# Patient Record
Sex: FEMALE | Race: BLACK OR AFRICAN AMERICAN | NOT HISPANIC OR LATINO | Employment: UNEMPLOYED | URBAN - METROPOLITAN AREA
[De-identification: names, ages, dates, MRNs, and addresses within clinical notes are randomized per-mention and may not be internally consistent; named-entity substitution may affect disease eponyms.]

---

## 2019-06-26 ENCOUNTER — HOSPITAL ENCOUNTER (EMERGENCY)
Facility: HOSPITAL | Age: 29
Discharge: HOME | End: 2019-06-26
Attending: EMERGENCY MEDICINE

## 2019-06-26 VITALS
WEIGHT: 172.84 LBS | DIASTOLIC BLOOD PRESSURE: 71 MMHG | RESPIRATION RATE: 16 BRPM | SYSTOLIC BLOOD PRESSURE: 107 MMHG | HEART RATE: 80 BPM | OXYGEN SATURATION: 100 % | TEMPERATURE: 97.6 F

## 2019-06-26 DIAGNOSIS — K08.89 PAIN, DENTAL: Primary | ICD-10-CM

## 2019-06-26 DIAGNOSIS — K04.7 DENTAL INFECTION: ICD-10-CM

## 2019-06-26 PROCEDURE — 99282 EMERGENCY DEPT VISIT SF MDM: CPT

## 2019-06-26 PROCEDURE — 99283 EMERGENCY DEPT VISIT LOW MDM: CPT | Performed by: PHYSICIAN ASSISTANT

## 2019-06-26 RX ORDER — AMOXICILLIN AND CLAVULANATE POTASSIUM 875; 125 MG/1; MG/1
1 TABLET, FILM COATED ORAL EVERY 12 HOURS SCHEDULED
Qty: 20 TABLET | Refills: 0 | Status: SHIPPED | OUTPATIENT
Start: 2019-06-26 | End: 2019-07-06

## 2019-06-27 NOTE — ED PROVIDER NOTES
History  Chief Complaint   Patient presents with    Dental Pain     DIMPLE dental pain that started this morning  Patient is a 71-year-old female with no significant past medical history presents for evaluation of left upper dental pain  She states that she has a known broken/chipped tooth and cavity to the area  She states that she woke up this morning and had some swelling to the cheek  She took some ibuprofen this morning without significant release  She had not seen a dentist for a while  She states that she has been here in [place] for the past 4 months but is from [place] originally  She states that she was seeing a dentist and orthodontist in [place]  She had braces on at one point but some have fallen off  She denies any fever, chills, nausea vomiting diarrhea, chest pain, shortness breath, abdominal pain, neck pain or swelling, sore throat or difficulty swallowing  None       History reviewed  No pertinent past medical history  History reviewed  No pertinent surgical history  History reviewed  No pertinent family history  I have reviewed and agree with the history as documented  Social History     Tobacco Use    Smoking status: Current Some Day Smoker    Smokeless tobacco: Never Used    Tobacco comment: when asked if daily use patient states "not really"    Substance Use Topics    Alcohol use: Yes     Comment: socially    Drug use: Not Currently        Review of Systems   Constitutional: Negative for chills and fever  HENT: Positive for dental problem and facial swelling  Negative for ear pain, sore throat and trouble swallowing  Eyes: Negative for visual disturbance  Respiratory: Negative for shortness of breath  Cardiovascular: Negative for chest pain  Gastrointestinal: Negative for abdominal pain, diarrhea, nausea and vomiting  Musculoskeletal: Negative for neck pain and neck stiffness  Skin: Negative for rash     All other systems reviewed and are negative  Physical Exam  Physical Exam   Constitutional: She is oriented to person, place, and time  She appears well-developed and well-nourished  No distress  HENT:   Head: Normocephalic and atraumatic  Right Ear: Hearing, tympanic membrane, external ear and ear canal normal    Left Ear: Hearing, tympanic membrane, external ear and ear canal normal    Nose: Nose normal    Mouth/Throat: Uvula is midline, oropharynx is clear and moist and mucous membranes are normal  No oral lesions  No trismus in the jaw  Abnormal dentition  Dental caries present  No uvula swelling  No oropharyngeal exudate, posterior oropharyngeal edema or posterior oropharyngeal erythema  Left upper tooth chipped/broken to the gum line with caries  No obvious fluctuant dental abscess  There is some gum erythema  There is some mild overlying facial/cheek swelling without induration or fluctuance  Handles oral secretions well without difficulty  Eyes: Conjunctivae and EOM are normal    Neck: Normal range of motion  Neck supple  Cardiovascular: Normal rate, regular rhythm and normal heart sounds  Exam reveals no gallop and no friction rub  No murmur heard  Pulmonary/Chest: Effort normal and breath sounds normal  No stridor  No respiratory distress  She has no wheezes  Abdominal: Soft  Bowel sounds are normal  She exhibits no distension  There is no tenderness  There is no guarding  Musculoskeletal: Normal range of motion  Lymphadenopathy:     She has no cervical adenopathy  Neurological: She is alert and oriented to person, place, and time  Skin: Skin is warm and dry  She is not diaphoretic  Psychiatric: She has a normal mood and affect  Her behavior is normal    Nursing note and vitals reviewed        Vital Signs  ED Triage Vitals [06/26/19 2006]   Temperature Pulse Respirations Blood Pressure SpO2   97 6 °F (36 4 °C) 80 16 107/71 100 %      Temp Source Heart Rate Source Patient Position - Orthostatic VS BP Location FiO2 (%)   Oral -- Sitting Right arm --      Pain Score       6           Vitals:    06/26/19 2006   BP: 107/71   Pulse: 80   Patient Position - Orthostatic VS: Sitting         Visual Acuity      ED Medications  Medications - No data to display    Diagnostic Studies  Results Reviewed     None                 No orders to display              Procedures  Procedures       ED Course                               MDM  Number of Diagnoses or Management Options  Dental infection:   Pain, dental:   Diagnosis management comments: Discussed dental caries and infection with the patient  Will start on Augmentin  Advised to take Tylenol or Motrin for pain  Instructed she needs to see a dentist as soon as possible  Given contact information so for multiple dental clinics in the area  Also given information for [facility] to call tomorrow for an appointment  Discussed strict return precautions if symptoms worsen or new symptoms arise  Patient states understanding and agrees with plan  Patient Progress  Patient progress: stable      Disposition  Final diagnoses:   Pain, dental   Dental infection     Time reflects when diagnosis was documented in both MDM as applicable and the Disposition within this note     Time User Action Codes Description Comment    6/26/2019  8:19 PM Ana Earl [K08 89] Pain, dental     6/26/2019  8:19 PM Ana Earl [K04 7] Dental infection       ED Disposition     ED Disposition Condition Date/Time Comment    Discharge Stable Wed Jun 26, 2019  8:19 PM Lillian Parmar discharge to home/self care              Follow-up Information     Follow up With Specialties Details Why Contact Info Additional [address] [facility] Family Medicine Schedule an appointment as soon as possible for a visit in 1 day  [address] [place] [address]  [phone]  [address] [place] [address],[address] [facility], [place], [place], [phone]    [address] [facility]  Schedule an appointment as soon as possible for a visit in 1 day  [address]  [place] [address]  [phone]     [address]  Schedule an appointment as soon as possible for a visit in 1 day  [address] [facility]     [address] [facility]  Schedule an appointment as soon as possible for a visit in 1 day  [address] [facility],4Th Floor for Oral and Maxillofacial Surgery [place]  Schedule an appointment as soon as possible for a visit in 1 day  [address]  [phone]           Discharge Medication List as of 6/26/2019  8:21 PM      START taking these medications    Details   amoxicillin-clavulanate (AUGMENTIN) 875-125 mg per tablet Take 1 tablet by mouth every 12 (twelve) hours for 10 days, Starting Wed 6/26/2019, Until Sat 7/6/2019, Print           No discharge procedures on file      ED Provider  Electronically Signed by           Alejo Renteria PA-C  06/26/19 2034

## 2023-09-13 ENCOUNTER — HOSPITAL ENCOUNTER (EMERGENCY)
Facility: HOSPITAL | Age: 33
Discharge: HOME | End: 2023-09-13
Attending: EMERGENCY MEDICINE | Admitting: EMERGENCY MEDICINE

## 2023-09-13 VITALS
RESPIRATION RATE: 17 BRPM | OXYGEN SATURATION: 98 % | WEIGHT: 172.18 LBS | HEART RATE: 86 BPM | DIASTOLIC BLOOD PRESSURE: 74 MMHG | TEMPERATURE: 98.6 F | SYSTOLIC BLOOD PRESSURE: 111 MMHG

## 2023-09-13 DIAGNOSIS — R51.9 HEADACHE: Primary | ICD-10-CM

## 2023-09-13 DIAGNOSIS — R11.2 NAUSEA AND VOMITING: ICD-10-CM

## 2023-09-13 LAB
ALBUMIN SERPL BCP-MCNC: 4.1 G/DL (ref 3.5–5)
ALP SERPL-CCNC: 57 U/L (ref 34–104)
ALT SERPL W P-5'-P-CCNC: 29 U/L (ref 7–52)
ANION GAP SERPL CALCULATED.3IONS-SCNC: 5 MMOL/L
AST SERPL W P-5'-P-CCNC: 22 U/L (ref 13–39)
BASOPHILS # BLD AUTO: 0.01 THOUSANDS/ÂΜL (ref 0–0.1)
BASOPHILS NFR BLD AUTO: 0 % (ref 0–1)
BILIRUB SERPL-MCNC: 0.2 MG/DL (ref 0.2–1)
BUN SERPL-MCNC: 6 MG/DL (ref 5–25)
CALCIUM SERPL-MCNC: 9 MG/DL (ref 8.4–10.2)
CHLORIDE SERPL-SCNC: 104 MMOL/L (ref 96–108)
CO2 SERPL-SCNC: 29 MMOL/L (ref 21–32)
CREAT SERPL-MCNC: 0.62 MG/DL (ref 0.6–1.3)
EOSINOPHIL # BLD AUTO: 0 THOUSAND/ÂΜL (ref 0–0.61)
EOSINOPHIL NFR BLD AUTO: 0 % (ref 0–6)
ERYTHROCYTE [DISTWIDTH] IN BLOOD BY AUTOMATED COUNT: 12.3 % (ref 11.6–15.1)
EXT PREGNANCY TEST URINE: NEGATIVE
EXT. CONTROL: NORMAL
GFR SERPL CREATININE-BSD FRML MDRD: 118 ML/MIN/1.73SQ M
GLUCOSE SERPL-MCNC: 85 MG/DL (ref 65–140)
HCT VFR BLD AUTO: 41.9 % (ref 34.8–46.1)
HGB BLD-MCNC: 13.8 G/DL (ref 11.5–15.4)
IMM GRANULOCYTES # BLD AUTO: 0 THOUSAND/UL (ref 0–0.2)
IMM GRANULOCYTES NFR BLD AUTO: 0 % (ref 0–2)
LIPASE SERPL-CCNC: 10 U/L (ref 11–82)
LYMPHOCYTES # BLD AUTO: 2.37 THOUSANDS/ÂΜL (ref 0.6–4.47)
LYMPHOCYTES NFR BLD AUTO: 66 % (ref 14–44)
MCH RBC QN AUTO: 31.6 PG (ref 26.8–34.3)
MCHC RBC AUTO-ENTMCNC: 32.9 G/DL (ref 31.4–37.4)
MCV RBC AUTO: 96 FL (ref 82–98)
MONOCYTES # BLD AUTO: 0.38 THOUSAND/ÂΜL (ref 0.17–1.22)
MONOCYTES NFR BLD AUTO: 11 % (ref 4–12)
NEUTROPHILS # BLD AUTO: 0.84 THOUSANDS/ÂΜL (ref 1.85–7.62)
NEUTS SEG NFR BLD AUTO: 23 % (ref 43–75)
NRBC BLD AUTO-RTO: 0 /100 WBCS
PLATELET # BLD AUTO: 213 THOUSANDS/UL (ref 149–390)
PMV BLD AUTO: 9.4 FL (ref 8.9–12.7)
POTASSIUM SERPL-SCNC: 3.4 MMOL/L (ref 3.5–5.3)
PROT SERPL-MCNC: 6.9 G/DL (ref 6.4–8.4)
RBC # BLD AUTO: 4.37 MILLION/UL (ref 3.81–5.12)
SODIUM SERPL-SCNC: 138 MMOL/L (ref 135–147)
WBC # BLD AUTO: 3.6 THOUSAND/UL (ref 4.31–10.16)

## 2023-09-13 PROCEDURE — 96361 HYDRATE IV INFUSION ADD-ON: CPT

## 2023-09-13 PROCEDURE — 99283 EMERGENCY DEPT VISIT LOW MDM: CPT

## 2023-09-13 PROCEDURE — 36415 COLL VENOUS BLD VENIPUNCTURE: CPT | Performed by: EMERGENCY MEDICINE

## 2023-09-13 PROCEDURE — 83690 ASSAY OF LIPASE: CPT | Performed by: EMERGENCY MEDICINE

## 2023-09-13 PROCEDURE — 81025 URINE PREGNANCY TEST: CPT | Performed by: PHYSICIAN ASSISTANT

## 2023-09-13 PROCEDURE — 85025 COMPLETE CBC W/AUTO DIFF WBC: CPT | Performed by: EMERGENCY MEDICINE

## 2023-09-13 PROCEDURE — 96374 THER/PROPH/DIAG INJ IV PUSH: CPT

## 2023-09-13 PROCEDURE — 80053 COMPREHEN METABOLIC PANEL: CPT | Performed by: EMERGENCY MEDICINE

## 2023-09-13 PROCEDURE — 96375 TX/PRO/DX INJ NEW DRUG ADDON: CPT

## 2023-09-13 RX ORDER — DIPHENHYDRAMINE HYDROCHLORIDE 50 MG/ML
25 INJECTION INTRAMUSCULAR; INTRAVENOUS ONCE
Status: COMPLETED | OUTPATIENT
Start: 2023-09-13 | End: 2023-09-13

## 2023-09-13 RX ORDER — NAPROXEN 500 MG/1
500 TABLET ORAL 2 TIMES DAILY WITH MEALS
Qty: 30 TABLET | Refills: 0 | Status: SHIPPED | OUTPATIENT
Start: 2023-09-13

## 2023-09-13 RX ORDER — ONDANSETRON 4 MG/1
4 TABLET, ORALLY DISINTEGRATING ORAL EVERY 6 HOURS PRN
Qty: 20 TABLET | Refills: 0 | Status: SHIPPED | OUTPATIENT
Start: 2023-09-13

## 2023-09-13 RX ORDER — ACETAMINOPHEN 325 MG/1
975 TABLET ORAL ONCE
Status: COMPLETED | OUTPATIENT
Start: 2023-09-13 | End: 2023-09-13

## 2023-09-13 RX ORDER — ONDANSETRON 2 MG/ML
4 INJECTION INTRAMUSCULAR; INTRAVENOUS ONCE
Status: COMPLETED | OUTPATIENT
Start: 2023-09-13 | End: 2023-09-13

## 2023-09-13 RX ORDER — KETOROLAC TROMETHAMINE 30 MG/ML
15 INJECTION, SOLUTION INTRAMUSCULAR; INTRAVENOUS ONCE
Status: COMPLETED | OUTPATIENT
Start: 2023-09-13 | End: 2023-09-13

## 2023-09-13 RX ADMIN — SODIUM CHLORIDE 1000 ML: 0.9 INJECTION, SOLUTION INTRAVENOUS at 18:16

## 2023-09-13 RX ADMIN — KETOROLAC TROMETHAMINE 15 MG: 30 INJECTION, SOLUTION INTRAMUSCULAR; INTRAVENOUS at 18:10

## 2023-09-13 RX ADMIN — ACETAMINOPHEN 325MG 975 MG: 325 TABLET ORAL at 18:09

## 2023-09-13 RX ADMIN — DIPHENHYDRAMINE HYDROCHLORIDE 25 MG: 50 INJECTION, SOLUTION INTRAMUSCULAR; INTRAVENOUS at 18:10

## 2023-09-13 RX ADMIN — ONDANSETRON 4 MG: 2 INJECTION INTRAMUSCULAR; INTRAVENOUS at 17:34

## 2023-09-13 NOTE — ED PROVIDER NOTES
History  Chief Complaint   Patient presents with   • Headache     Pt reports being sick since Sunday, states her head is heavy and throbbing, nausea, vomiting intermittently. Bridget Cagle is a 28 y.o. female with no significant past medical history presenting to the ER complaining of intermittent headache over the past 3 days. Patient reports that she feels the headache is a very tight band around her forehead. She reports that as of recent time she has been stressed out as she is going through a divorce and she feels this may be worsening her headache. Reports associated nausea and vomiting about 1-2 times a day. Denies any visual changes, neck pain, fevers, chills, numbness or tingling in any of the extremities, chest pain, shortness of breath, or urinary symptoms. Denies any trauma. None       History reviewed. No pertinent past medical history. History reviewed. No pertinent surgical history. History reviewed. No pertinent family history. I have reviewed and agree with the history as documented. E-Cigarette/Vaping     E-Cigarette/Vaping Substances     Social History     Tobacco Use   • Smoking status: Some Days   • Smokeless tobacco: Never   • Tobacco comments:     when asked if daily use patient states "not really"    Substance Use Topics   • Alcohol use: Yes     Comment: socially   • Drug use: Not Currently       Review of Systems   Constitutional: Negative for chills and fever. HENT: Negative for ear pain and sore throat. Eyes: Negative for pain and visual disturbance. Respiratory: Negative for cough and shortness of breath. Cardiovascular: Negative for chest pain and palpitations. Gastrointestinal: Negative for abdominal pain and vomiting. Genitourinary: Negative for dysuria and hematuria. Musculoskeletal: Negative for arthralgias and back pain. Skin: Negative for color change and rash. Neurological: Positive for headaches. Negative for seizures and syncope. All other systems reviewed and are negative. Physical Exam  Physical Exam  Vitals and nursing note reviewed. Constitutional:       General: She is not in acute distress. Appearance: She is well-developed. She is not ill-appearing, toxic-appearing or diaphoretic. HENT:      Head: Normocephalic and atraumatic. Eyes:      Conjunctiva/sclera: Conjunctivae normal.   Cardiovascular:      Rate and Rhythm: Normal rate and regular rhythm. Heart sounds: No murmur heard. Pulmonary:      Effort: Pulmonary effort is normal. No respiratory distress. Breath sounds: Normal breath sounds. Abdominal:      Palpations: Abdomen is soft. Tenderness: There is no abdominal tenderness. Musculoskeletal:         General: No swelling. Cervical back: Normal range of motion and neck supple. No rigidity or tenderness. Skin:     General: Skin is warm and dry. Capillary Refill: Capillary refill takes less than 2 seconds. Neurological:      General: No focal deficit present. Mental Status: She is alert and oriented to person, place, and time. Cranial Nerves: No cranial nerve deficit. Sensory: No sensory deficit. Motor: No weakness.       Gait: Gait normal.   Psychiatric:         Mood and Affect: Mood normal.         Vital Signs  ED Triage Vitals [09/13/23 1550]   Temperature Pulse Respirations Blood Pressure SpO2   98.6 °F (37 °C) 99 18 107/68 97 %      Temp Source Heart Rate Source Patient Position - Orthostatic VS BP Location FiO2 (%)   Oral Monitor Sitting Right arm --      Pain Score       9           Vitals:    09/13/23 1550 09/13/23 1836   BP: 107/68 111/74   Pulse: 99 86   Patient Position - Orthostatic VS: Sitting Lying         Visual Acuity  Visual Acuity    Flowsheet Row Most Recent Value   L Pupil Size (mm) 3   R Pupil Size (mm) 3          ED Medications  Medications   ondansetron (ZOFRAN) injection 4 mg (4 mg Intravenous Given 9/13/23 4390)   diphenhydrAMINE (BENADRYL) injection 25 mg (25 mg Intravenous Given 9/13/23 1810)   ketorolac (TORADOL) injection 15 mg (15 mg Intravenous Given 9/13/23 1810)   sodium chloride 0.9 % bolus 1,000 mL (0 mL Intravenous Stopped 9/13/23 1916)   acetaminophen (TYLENOL) tablet 975 mg (975 mg Oral Given 9/13/23 1809)       Diagnostic Studies  Results Reviewed     Procedure Component Value Units Date/Time    Comprehensive metabolic panel [[id number]]  (Abnormal) Collected: 09/13/23 1734    Lab Status: Final result Specimen: Blood from Arm, Right Updated: 09/13/23 1811     Sodium 138 mmol/L      Potassium 3.4 mmol/L      Chloride 104 mmol/L      CO2 29 mmol/L      ANION GAP 5 mmol/L      BUN 6 mg/dL      Creatinine 0.62 mg/dL      Glucose 85 mg/dL      Calcium 9.0 mg/dL      AST 22 U/L      ALT 29 U/L      Alkaline Phosphatase 57 U/L      Total Protein 6.9 g/dL      Albumin 4.1 g/dL      Total Bilirubin 0.20 mg/dL      eGFR 118 ml/min/1.73sq m     Narrative:      [place] guidelines for Chronic Kidney Disease (CKD):   •  Stage 1 with normal or high GFR (GFR > 90 mL/min/1.73 square meters)  •  Stage 2 Mild CKD (GFR = 60-89 mL/min/1.73 square meters)  •  Stage 3A Moderate CKD (GFR = 45-59 mL/min/1.73 square meters)  •  Stage 3B Moderate CKD (GFR = 30-44 mL/min/1.73 square meters)  •  Stage 4 Severe CKD (GFR = 15-29 mL/min/1.73 square meters)  •  Stage 5 End Stage CKD (GFR <15 mL/min/1.73 square meters)  Note: GFR calculation is accurate only with a steady state creatinine    Lipase [[id number]]  (Abnormal) Collected: 09/13/23 1734    Lab Status: Final result Specimen: Blood from Arm, Right Updated: 09/13/23 1811     Lipase 10 u/L     POCT pregnancy, urine [[id number]]  (Normal) Resulted: 09/13/23 1804    Lab Status: Final result Specimen: Urine Updated: 09/13/23 1804     EXT Preg Test, Ur Negative     Control Valid    CBC and differential [[id number]]  (Abnormal) Collected: 09/13/23 1734    Lab Status: Final result Specimen: Blood from Arm, Right Updated: 09/13/23 1743     WBC 3.60 Thousand/uL      RBC 4.37 Million/uL      Hemoglobin 13.8 g/dL      Hematocrit 41.9 %      MCV 96 fL      MCH 31.6 pg      MCHC 32.9 g/dL      RDW 12.3 %      MPV 9.4 fL      Platelets 101 Thousands/uL      nRBC 0 /100 WBCs      Neutrophils Relative 23 %      Immat GRANS % 0 %      Lymphocytes Relative 66 %      Monocytes Relative 11 %      Eosinophils Relative 0 %      Basophils Relative 0 %      Neutrophils Absolute 0.84 Thousands/µL      Immature Grans Absolute 0.00 Thousand/uL      Lymphocytes Absolute 2.37 Thousands/µL      Monocytes Absolute 0.38 Thousand/µL      Eosinophils Absolute 0.00 Thousand/µL      Basophils Absolute 0.01 Thousands/µL                  No orders to display              Procedures  Procedures         ED Course                               SBIRT 22yo+    Flowsheet Row Most Recent Value   Initial Alcohol Screen: US AUDIT-C     1. How often do you have a drink containing alcohol? 0 Filed at: 09/13/2023 1549   2. How many drinks containing alcohol do you have on a typical day you are drinking? 0 Filed at: 09/13/2023 1549   3b. FEMALE Any Age, or MALE 65+: How often do you have 4 or more drinks on one occassion? 0 Filed at: 09/13/2023 1549   Audit-C Score 0 Filed at: 09/13/2023 1549   FER: How many times in the past year have you. .. Used an illegal drug or used a prescription medication for non-medical reasons? Never Filed at: 09/13/2023 [facility]  Bridger Bella is a 28 y.o. female with no significant past medical history presenting to the ER complaining of intermittent headache over the past 3 days. Patient reports that she feels the headache is a very tight band around her forehead. Reports she has been under a lot of stress lately. Denies any associated symptoms. On exam patient is well-appearing in no acute distress. Vital signs within normal limits.   Physical examination reveals no neck rigidity or tenderness to palpation. No focal neurologic deficits on my exam.  Discussed patient the risk versus benefit of imaging the head to evaluate headache and utilized her decision making to avoid any imaging at this time. We will however check labs to rule out anemia, electrolyte abnormalities, and check for pregnancy. We will treat at this time with Toradol, Benadryl, Zofran, and IV fluids. Patient agreeable with plan. Lab work unremarkable. Discussed results with patient. After receiving medications patient reports complete resolution of headache and request be discharged. Extensive discussed with patient appropriate supportive care at home for headaches and advised close follow-up with PCP for reevaluation. Advised strict return precautions ER. Patient is understanding and agreeable to plan. Headache: acute illness or injury  Nausea and vomiting: acute illness or injury  Amount and/or Complexity of Data Reviewed  Labs: ordered. Risk  OTC drugs. Prescription drug management. Disposition  Final diagnoses:   Headache   Nausea and vomiting     Time reflects when diagnosis was documented in both MDM as applicable and the Disposition within this note     Time User Action Codes Description Comment    9/13/2023  6:51 PM [facility] Add [R51.9] Headache     9/13/2023  6:52 PM [facility] Add [R11.2] Nausea and vomiting       ED Disposition     ED Disposition   Discharge    Condition   Stable    Date/Time   Wed Sep 13, 2023  6:51 PM    Comment   hCris Aguayo discharge to home/self care.                Follow-up Information     Follow up With Specialties Details Why Contact Info Additional Information    [facility]  Family Medicine Schedule an appointment as soon as possible for a visit   [address]       [address] [facility] Emergency Department Emergency Medicine  If symptoms worsen [address] [address]  [address] [facility], [address] [facility], [place], [place], [address]          Discharge Medication List as of 9/13/2023  6:53 PM      START taking these medications    Details   naproxen (Naprosyn) 500 mg tablet Take 1 tablet (500 mg total) by mouth 2 (two) times a day with meals, Starting Wed 9/13/2023, Normal      ondansetron (ZOFRAN-ODT) 4 mg disintegrating tablet Take 1 tablet (4 mg total) by mouth every 6 (six) hours as needed for nausea or vomiting, Starting Wed 9/13/2023, Normal             No discharge procedures on file.     PDMP Review     None          ED Provider  Electronically Signed by           Shelby Whipple PA-C  09/13/23 9678

## 2023-12-12 NOTE — PATIENT INSTRUCTIONS
Wellness Visit for Adults   AMBULATORY CARE:   A wellness visit  is when you see your healthcare provider to get screened for health problems. Your healthcare provider will also give you advice on how to stay healthy. Write down your questions so you remember to ask them. Ask your healthcare provider how often you should have a wellness visit. What happens at a wellness visit:  Your healthcare provider will ask about your health, and your family history of health problems. This includes high blood pressure, heart disease, and cancer. He or she will ask if you have symptoms that concern you, if you smoke, and about your mood. You may also be asked about your intake of medicines, supplements, food, and alcohol. Any of the following may be done: Your weight  will be checked. Your height may also be checked so your body mass index (BMI) can be calculated. Your BMI shows if you are at a healthy weight. Your blood pressure  and heart rate will be checked. Your temperature may also be checked. Blood and urine tests  may be done. Blood tests may be done to check your cholesterol levels. Abnormal cholesterol levels increase your risk for heart disease and stroke. You may also need a blood or urine test to check for diabetes if you are at increased risk. Urine tests may be done to look for signs of an infection or kidney disease. A physical exam  includes checking your heartbeat and lungs with a stethoscope. Your healthcare provider may also check your skin to look for sun damage. Screening tests  may be recommended. A screening test is done to check for diseases that may not cause symptoms. The screening tests you may need depend on your age, gender, family history, and lifestyle habits. For example, colorectal screening may be recommended if you are 48years old or older. Screening tests you need if you are a woman:   A Pap smear  is used to screen for cervical cancer.  Pap smears are usually done every 3 to 5 years depending on your age. You may need them more often if you have had abnormal Pap smear test results in the past. Ask your healthcare provider how often you should have a Pap smear. A mammogram  is an x-ray of your breasts to screen for breast cancer. Experts recommend mammograms every 2 years starting at age 48 years. You may need a mammogram at age 52 years or younger if you have an increased risk for breast cancer. Talk to your healthcare provider about when you should start having mammograms and how often you need them. Vaccines you may need:   Get an influenza vaccine  every year. The influenza vaccine protects you from the flu. Several types of viruses cause the flu. The viruses change over time, so new vaccines are made each year. Get a tetanus-diphtheria (Td) booster vaccine  every 10 years. This vaccine protects you against tetanus and diphtheria. Tetanus is a severe infection that may cause painful muscle spasms and lockjaw. Diphtheria is a severe bacterial infection that causes a thick covering in the back of your mouth and throat. Get a human papillomavirus (HPV) vaccine  if you are female and aged 23 to 32 or male 23 to 24 and never received it. This vaccine protects you from HPV infection. HPV is the most common infection spread by sexual contact. HPV may also cause vaginal, penile, and anal cancers. Get a pneumococcal vaccine  if you are aged 72 years or older. The pneumococcal vaccine is an injection given to protect you from pneumococcal disease. Pneumococcal disease is an infection caused by pneumococcal bacteria. The infection may cause pneumonia, meningitis, or an ear infection. Get a shingles vaccine  if you are 60 or older, even if you have had shingles before. The shingles vaccine is an injection to protect you from the varicella-zoster virus. This is the same virus that causes chickenpox.  Shingles is a painful rash that develops in people who had chickenpox or have been exposed to the virus. How to eat healthy:  My Plate is a model for planning healthy meals. It shows the types and amounts of foods that should go on your plate. Fruits and vegetables make up about half of your plate, and grains and protein make up the other half. A serving of dairy is included on the side of your plate. The amount of calories and serving sizes you need depends on your age, gender, weight, and height. Examples of healthy foods are listed below:  Eat a variety of vegetables  such as dark green, red, and orange vegetables. You can also include canned vegetables low in sodium (salt) and frozen vegetables without added butter or sauces. Eat a variety of fresh fruits , canned fruit in 100% juice, frozen fruit, and dried fruit. Include whole grains. At least half of the grains you eat should be whole grains. Examples include whole-wheat bread, wheat pasta, brown rice, and whole-grain cereals such as oatmeal.    Eat a variety of protein foods such as seafood (fish and shellfish), lean meat, and poultry without skin (turkey and chicken). Examples of lean meats include pork leg, shoulder, or tenderloin, and beef round, sirloin, tenderloin, and extra lean ground beef. Other protein foods include eggs and egg substitutes, beans, peas, soy products, nuts, and seeds. Choose low-fat dairy products such as skim or 1% milk or low-fat yogurt, cheese, and cottage cheese. Limit unhealthy fats  such as butter, hard margarine, and shortening. Exercise:  Exercise at least 30 minutes per day on most days of the week. Some examples of exercise include walking, biking, dancing, and swimming. You can also fit in more physical activity by taking the stairs instead of the elevator or parking farther away from stores. Include muscle strengthening activities 2 days each week. Regular exercise provides many health benefits.  It helps you manage your weight, and decreases your risk for type 2 diabetes, heart disease, stroke, and high blood pressure. Exercise can also help improve your mood. Ask your healthcare provider about the best exercise plan for you. General health and safety guidelines:   Do not smoke. Nicotine and other chemicals in cigarettes and cigars can cause lung damage. Ask your healthcare provider for information if you currently smoke and need help to quit. E-cigarettes or smokeless tobacco still contain nicotine. Talk to your healthcare provider before you use these products. Limit alcohol. A drink of alcohol is 12 ounces of beer, 5 ounces of wine, or 1½ ounces of liquor. Lose weight, if needed. Being overweight increases your risk of certain health conditions. These include heart disease, high blood pressure, type 2 diabetes, and certain types of cancer. Protect your skin. Do not sunbathe or use tanning beds. Use sunscreen with a SPF 15 or higher. Apply sunscreen at least 15 minutes before you go outside. Reapply sunscreen every 2 hours. Wear protective clothing, hats, and sunglasses when you are outside. Drive safely. Always wear your seatbelt. Make sure everyone in your car wears a seatbelt. A seatbelt can save your life if you are in an accident. Do not use your cell phone when you are driving. This could distract you and cause an accident. Pull over if you need to make a call or send a text message. Practice safe sex. Use latex condoms if are sexually active and have more than one partner. Your healthcare provider may recommend screening tests for sexually transmitted infections (STIs). Wear helmets, lifejackets, and protective gear. Always wear a helmet when you ride a bike or motorcycle, go skiing, or play sports that could cause a head injury. Wear protective equipment when you play sports. Wear a lifejacket when you are on a boat or doing water sports.     © Copyright [employer] 2023 Information is for End User's use only and may not be sold, redistributed or otherwise used for commercial purposes. The above information is an  only. It is not intended as medical advice for individual conditions or treatments. Talk to your doctor, nurse or pharmacist before following any medical regimen to see if it is safe and effective for you.

## 2023-12-13 ENCOUNTER — OFFICE VISIT (OUTPATIENT)
Dept: FAMILY MEDICINE CLINIC | Facility: CLINIC | Age: 33
End: 2023-12-13

## 2023-12-13 VITALS
HEIGHT: 65 IN | HEART RATE: 83 BPM | OXYGEN SATURATION: 98 % | BODY MASS INDEX: 30.32 KG/M2 | SYSTOLIC BLOOD PRESSURE: 105 MMHG | WEIGHT: 182 LBS | DIASTOLIC BLOOD PRESSURE: 71 MMHG | TEMPERATURE: 98 F

## 2023-12-13 DIAGNOSIS — N75.0 BARTHOLIN GLAND CYST: Primary | ICD-10-CM

## 2023-12-13 DIAGNOSIS — S02.5XXB OPEN FRACTURE OF TOOTH, INITIAL ENCOUNTER: ICD-10-CM

## 2023-12-13 PROCEDURE — 99214 OFFICE O/P EST MOD 30 MIN: CPT | Performed by: FAMILY MEDICINE

## 2023-12-13 NOTE — ASSESSMENT & PLAN NOTE
Patient reports a lump of her right labia majora. She reports that the lump had appeared about 6 months ago and receded. About 2 months ago she noticed a lump starting to form and it is increased in size. She reports some pain with prolonged sitting, and has not tried any creams or OTC therapies.       Etiology: Likely Bartholin gland cyst    Plan:  - Refer to gynecology for management  -Patient may use warm compresses  -Follow-up in 3 to 4 weeks for annual physical
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
## 2023-12-13 NOTE — ASSESSMENT & PLAN NOTE
Patient reports that about 2 weeks ago, one of her right upper molars cracked and partially fell out. She denies eating anything or trauma to the area prior to. She does state that she has not seen a dentist in several years, however, she reports flossing and brushing teeth twice a day.     Plan:  - Refer to [facility] dental clinic  -Counseled on proper dental hygiene

## 2023-12-22 ENCOUNTER — OFFICE VISIT (OUTPATIENT)
Dept: OBGYN CLINIC | Facility: CLINIC | Age: 33
End: 2023-12-22

## 2023-12-22 VITALS
SYSTOLIC BLOOD PRESSURE: 100 MMHG | BODY MASS INDEX: 30.99 KG/M2 | WEIGHT: 186.8 LBS | DIASTOLIC BLOOD PRESSURE: 61 MMHG | HEART RATE: 85 BPM

## 2023-12-22 DIAGNOSIS — N75.0 BARTHOLIN GLAND CYST: ICD-10-CM

## 2023-12-22 PROBLEM — S02.5XXB OPEN FRACTURE OF TOOTH: Status: RESOLVED | Noted: 2023-12-13 | Resolved: 2023-12-22

## 2023-12-22 PROCEDURE — 99203 OFFICE O/P NEW LOW 30 MIN: CPT | Performed by: NURSE PRACTITIONER

## 2023-12-22 PROCEDURE — 56420 I&D BARTHOLINS GLAND ABSCESS: CPT | Performed by: NURSE PRACTITIONER

## 2023-12-22 RX ORDER — LIDOCAINE HYDROCHLORIDE 20 MG/ML
4 INJECTION, SOLUTION EPIDURAL; INFILTRATION; INTRACAUDAL; PERINEURAL ONCE
Status: COMPLETED | OUTPATIENT
Start: 2023-12-22 | End: 2023-12-22

## 2023-12-22 RX ORDER — IBUPROFEN 600 MG/1
600 TABLET ORAL EVERY 6 HOURS PRN
Qty: 30 TABLET | Refills: 0 | Status: SHIPPED | OUTPATIENT
Start: 2023-12-22

## 2023-12-22 RX ADMIN — LIDOCAINE HYDROCHLORIDE 4 ML: 20 INJECTION, SOLUTION EPIDURAL; INFILTRATION; INTRACAUDAL; PERINEURAL at 10:21

## 2023-12-22 NOTE — PROGRESS NOTES
PROBLEM GYNECOLOGICAL VISIT    Shivani Hdz is a 33 y.o. female who presents today with complaint of Bartholin gland cyst.  Her general medical history has been reviewed and she reports it as follows:    Past Medical History:   Diagnosis Date    Migraine      Past Surgical History:   Procedure Laterality Date    NO PAST SURGERIES       OB History          3    Para   2    Term   2       0    AB   1    Living   2         SAB   0    IAB   1    Ectopic   0    Multiple   0    Live Births   2               Social History     Tobacco Use    Smoking status: Former     Types: Cigarettes     Start date: 2023     Quit date:      Years since quittin.9    Smokeless tobacco: Never   Vaping Use    Vaping status: Never Used   Substance Use Topics    Alcohol use: Yes     Comment: couple times/year    Drug use: Yes     Types: Marijuana     Comment: couple times/month     Social History     Substance and Sexual Activity   Sexual Activity Not Currently    Partners: Male    Birth control/protection: Condom Male       No current outpatient medications    History of Present Illness:   Reports lump on R labia which started 6-8 months ago and then resolved spontaneously.  But now has recurred approximately 2 months ago and is becoming larger and more uncomfortable and tender.  She is having difficulty sitting for more than just short periods of time.  Denies any fevers or vaginal discharge, odor.    Review of Systems:  Review of Systems   Constitutional: Negative.    Gastrointestinal: Negative.    Genitourinary:  Positive for vaginal pain. Negative for vaginal discharge.        Large labia lump       Physical Exam:  /61   Pulse 85   Wt 84.7 kg (186 lb 12.8 oz)   LMP 2023 (Approximate)   BMI 30.99 kg/m²   Physical Exam  Constitutional:       General: She is not in acute distress.  Genitourinary:      Right Labia: tenderness and Bartholin's cyst.      Left Labia: No Bartholin's  cyst.        Neurological:      Mental Status: She is alert.   Skin:     General: Skin is warm and dry.   Vitals reviewed.       Assessment:   1. R side Bartholin's gland cyst    Plan:   1. I&D of cyst by Dr. Kelley   2. Given Rx Ibuprofen for prn use.   3. Return to office in 1 week for follow up evaluation.

## 2023-12-22 NOTE — PROGRESS NOTES
Incision and drain    Date/Time: 12/22/2023 10:08 AM    Performed by: Celestina Kelley MD  Authorized by: Celestina Kelley MD  Universal Protocol:  Procedure performed by:  Consent: Written consent obtained.  Risks and benefits: risks, benefits and alternatives were discussed  Consent given by: patient  Patient understanding: patient states understanding of the procedure being performed  Patient consent: the patient's understanding of the procedure matches consent given  Procedure consent: procedure consent matches procedure scheduled  Relevant documents: relevant documents present and verified  Test results: test results not available  Site marked: the operative site was not marked  Radiology Images displayed and confirmed. If images not available, report reviewed: imaging studies not available  Patient identity confirmed: verbally with patient    Patient location:  Bedside  Location:     Type:  Bartholin cyst    Size:  4 cm    Location:  Anogenital    Anogenital location:  Bartholin's gland  Pre-procedure details:     Skin preparation:  Betadine  Anesthesia (see MAR for exact dosages):     Anesthesia method:  Local infiltration    Local anesthetic:  Lidocaine 2% w/o epi  Procedure details:     Complexity:  Simple    Needle aspiration: no      Incision types:  Stab incision    Scalpel blade:  11    Approach:  Puncture    Incision depth:  Dermal    Drainage:  Purulent    Drainage amount:  Copious    Packing materials:  None  Post-procedure details:     Patient tolerance of procedure:  Tolerated well, no immediate complications  Comments:      After cyst was drained, a hemostat was used to grasp cyst wall that was visualized and partially excised. There was noted to be a small incision on the skin lateral to the stab incision. 4-0 Vicryl was used to achieve hemostasis of this incision. The stab incision for drainage was also noted to continue to ooze. Pressure held for several minutes without hemostasis. One  figure of eight suture was placed to reapproximate the incision with good hemostasis noted.

## 2023-12-22 NOTE — PATIENT INSTRUCTIONS
Thank you for your confidence in our team.   We appreciate you and welcome your feedback.   If you receive a survey from us, please take a few moments to let us know how we are doing.   Sincerely,  SARIAH Carrera

## 2023-12-28 ENCOUNTER — OFFICE VISIT (OUTPATIENT)
Dept: OBGYN CLINIC | Facility: CLINIC | Age: 33
End: 2023-12-28

## 2023-12-28 ENCOUNTER — HOSPITAL ENCOUNTER (EMERGENCY)
Facility: HOSPITAL | Age: 33
Discharge: HOME | End: 2023-12-28
Attending: EMERGENCY MEDICINE

## 2023-12-28 VITALS
WEIGHT: 159 LBS | HEART RATE: 107 BPM | DIASTOLIC BLOOD PRESSURE: 80 MMHG | OXYGEN SATURATION: 100 % | BODY MASS INDEX: 26.38 KG/M2 | RESPIRATION RATE: 18 BRPM | SYSTOLIC BLOOD PRESSURE: 152 MMHG | TEMPERATURE: 98.9 F

## 2023-12-28 VITALS
BODY MASS INDEX: 30.53 KG/M2 | DIASTOLIC BLOOD PRESSURE: 63 MMHG | HEART RATE: 111 BPM | WEIGHT: 184 LBS | SYSTOLIC BLOOD PRESSURE: 144 MMHG

## 2023-12-28 DIAGNOSIS — N75.0 BARTHOLIN'S GLAND CYST: Primary | ICD-10-CM

## 2023-12-28 DIAGNOSIS — N75.1 BARTHOLIN'S GLAND ABSCESS: Primary | ICD-10-CM

## 2023-12-28 LAB
HBV SURFACE AG SER QL: NORMAL
HCV AB SER QL: NORMAL
HIV 1+2 AB+HIV1 P24 AG SERPL QL IA: NORMAL
HIV1 P24 AG SER QL: NORMAL

## 2023-12-28 PROCEDURE — 86803 HEPATITIS C AB TEST: CPT | Performed by: PHYSICIAN ASSISTANT

## 2023-12-28 PROCEDURE — 99282 EMERGENCY DEPT VISIT SF MDM: CPT

## 2023-12-28 PROCEDURE — 36415 COLL VENOUS BLD VENIPUNCTURE: CPT | Performed by: PHYSICIAN ASSISTANT

## 2023-12-28 PROCEDURE — 87340 HEPATITIS B SURFACE AG IA: CPT | Performed by: PHYSICIAN ASSISTANT

## 2023-12-28 PROCEDURE — 87205 SMEAR GRAM STAIN: CPT | Performed by: PHYSICIAN ASSISTANT

## 2023-12-28 PROCEDURE — 87806 HIV AG W/HIV1&2 ANTB W/OPTIC: CPT | Performed by: PHYSICIAN ASSISTANT

## 2023-12-28 PROCEDURE — 96372 THER/PROPH/DIAG INJ SC/IM: CPT

## 2023-12-28 PROCEDURE — 99284 EMERGENCY DEPT VISIT MOD MDM: CPT | Performed by: PHYSICIAN ASSISTANT

## 2023-12-28 PROCEDURE — 56420 I&D BARTHOLINS GLAND ABSCESS: CPT | Performed by: PHYSICIAN ASSISTANT

## 2023-12-28 PROCEDURE — 87070 CULTURE OTHR SPECIMN AEROBIC: CPT | Performed by: PHYSICIAN ASSISTANT

## 2023-12-28 PROCEDURE — 99024 POSTOP FOLLOW-UP VISIT: CPT | Performed by: NURSE PRACTITIONER

## 2023-12-28 PROCEDURE — 87147 CULTURE TYPE IMMUNOLOGIC: CPT | Performed by: PHYSICIAN ASSISTANT

## 2023-12-28 RX ORDER — ACETAMINOPHEN 325 MG/1
975 TABLET ORAL ONCE
Status: COMPLETED | OUTPATIENT
Start: 2023-12-28 | End: 2023-12-28

## 2023-12-28 RX ORDER — KETOROLAC TROMETHAMINE 30 MG/ML
15 INJECTION, SOLUTION INTRAMUSCULAR; INTRAVENOUS ONCE
Status: COMPLETED | OUTPATIENT
Start: 2023-12-28 | End: 2023-12-28

## 2023-12-28 RX ORDER — IBUPROFEN 600 MG/1
600 TABLET ORAL EVERY 6 HOURS PRN
Qty: 30 TABLET | Refills: 0 | Status: SHIPPED | OUTPATIENT
Start: 2023-12-28

## 2023-12-28 RX ORDER — LIDOCAINE HYDROCHLORIDE 10 MG/ML
10 INJECTION, SOLUTION EPIDURAL; INFILTRATION; INTRACAUDAL; PERINEURAL ONCE
Status: COMPLETED | OUTPATIENT
Start: 2023-12-28 | End: 2023-12-28

## 2023-12-28 RX ORDER — MIDAZOLAM HYDROCHLORIDE 2 MG/2ML
2 INJECTION, SOLUTION INTRAMUSCULAR; INTRAVENOUS ONCE
Status: COMPLETED | OUTPATIENT
Start: 2023-12-28 | End: 2023-12-28

## 2023-12-28 RX ADMIN — KETOROLAC TROMETHAMINE 15 MG: 30 INJECTION, SOLUTION INTRAMUSCULAR; INTRAVENOUS at 10:21

## 2023-12-28 RX ADMIN — MIDAZOLAM 2 MG: 1 INJECTION INTRAMUSCULAR; INTRAVENOUS at 10:50

## 2023-12-28 RX ADMIN — ACETAMINOPHEN 975 MG: 325 TABLET ORAL at 10:21

## 2023-12-28 RX ADMIN — LIDOCAINE HYDROCHLORIDE 10 ML: 10 INJECTION, SOLUTION EPIDURAL; INFILTRATION; INTRACAUDAL; PERINEURAL at 10:21
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
## 2023-12-28 NOTE — Clinical Note
Carolyne Hdz was seen and treated in our emergency department on 12/28/2023.                Diagnosis:     Carolyne  may return to work on return date.    She may return on this date: 12/30/2023         If you have any questions or concerns, please don't hesitate to call.      Archana Jasmine PA-C    ______________________________           _______________          _______________  Hospital Representative                              Date                                Time

## 2023-12-28 NOTE — PROGRESS NOTES
Shivani Hdz presents today for follow up of Bartholin's gland cyst which was incised and drained on 12/22/23 here in the clinic by Dr. Kelley.  She reports that she was doing well until last night when the cyst recurred with severe pain and larger than previously.    She is here today crying and in severe pain.  On brief exam, R side Bartholin's gland cyst is present and patient is guarding against palpation of cyst.  Explained that I do not have a physician present in clinic today who is available to perform I&D of the cyst.    Advised her to proceed to ER for management.

## 2023-12-30 LAB
BACTERIA WND AEROBE CULT: ABNORMAL
BACTERIA WND AEROBE CULT: ABNORMAL
GRAM STN SPEC: ABNORMAL
GRAM STN SPEC: ABNORMAL

## 2024-01-02 ENCOUNTER — PATIENT OUTREACH (OUTPATIENT)
Dept: OBGYN CLINIC | Facility: CLINIC | Age: 34
End: 2024-01-02

## 2024-01-02 ENCOUNTER — OFFICE VISIT (OUTPATIENT)
Dept: OBGYN CLINIC | Facility: CLINIC | Age: 34
End: 2024-01-02

## 2024-01-02 VITALS
DIASTOLIC BLOOD PRESSURE: 68 MMHG | BODY MASS INDEX: 31.12 KG/M2 | HEART RATE: 76 BPM | SYSTOLIC BLOOD PRESSURE: 108 MMHG | WEIGHT: 187.6 LBS

## 2024-01-02 DIAGNOSIS — N75.0 BARTHOLIN'S GLAND CYST: Primary | ICD-10-CM

## 2024-01-02 DIAGNOSIS — Z59.9 FINANCIAL DIFFICULTIES: ICD-10-CM

## 2024-01-02 PROCEDURE — 99213 OFFICE O/P EST LOW 20 MIN: CPT | Performed by: NURSE PRACTITIONER

## 2024-01-02 SDOH — ECONOMIC STABILITY - INCOME SECURITY: PROBLEM RELATED TO HOUSING AND ECONOMIC CIRCUMSTANCES, UNSPECIFIED: Z59.9

## 2024-01-02 NOTE — PROGRESS NOTES
Carolyne Hdz presents today for follow up of Bartholin's gland cyst.  She was seen here on 12/28/2023 for the same and sent to ER for I&D.  I&D was performed in the ER and Word catheter was .  Patient reports that she felt significant relief.  She also reports that catheter fell out spontaneously later the same evening but that the cyst has continued to drain since then.  She reports that pain comes/goes but has been manageable with Ibuprofen prn.  Denies fever/chills.  She reports that she has been performing sitz baths regularly.    /68   Pulse 76   Wt 85.1 kg (187 lb 9.6 oz)   LMP 12/20/2023   BMI 31.12 kg/m²     On exam, R Bartholin's cyst is noted with small amount of serous drainage.  Incision is non-erythematous but tender to touch.  Denies vaginal itching/irritation/odor.    Advised to continue sitz baths and prn Ibuprofen.  Return in 1 week for follow up.  Discussed that recurrence is a possibility and further surgical management may be necessary.  Will have  meet with patient to assess financial concerns and lack of health insurance.
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
## 2024-01-11 ENCOUNTER — TELEPHONE (OUTPATIENT)
Dept: OBGYN CLINIC | Facility: CLINIC | Age: 34
End: 2024-01-11

## 2024-01-31 ENCOUNTER — OFFICE VISIT (OUTPATIENT)
Dept: OBGYN CLINIC | Facility: CLINIC | Age: 34
End: 2024-01-31

## 2024-01-31 VITALS
WEIGHT: 187.4 LBS | HEIGHT: 67 IN | BODY MASS INDEX: 29.41 KG/M2 | DIASTOLIC BLOOD PRESSURE: 80 MMHG | SYSTOLIC BLOOD PRESSURE: 115 MMHG | HEART RATE: 88 BPM

## 2024-01-31 DIAGNOSIS — N75.0 BARTHOLIN'S GLAND CYST: Primary | ICD-10-CM

## 2024-01-31 PROCEDURE — 99213 OFFICE O/P EST LOW 20 MIN: CPT | Performed by: NURSE PRACTITIONER

## 2024-01-31 NOTE — PROGRESS NOTES
Carolyne Hdz presents today for follow up of her Bartholin's gland cyst.  The R sided cyst required I&D on 12/22/2023 and again on 12/28/2023.  She reports that since then it has become swollen again, but is not painful.    On exam, R sided Bartholin's gland cyst is present without erythema or edema.    Discussed with patient that she may require surgical intervention such as marsupialization but should return to discuss with an OBGYN surgeon.  She is working on obtaining health insurance at this time as well.    Return in 1-2 months for annual GYN exam.

## 2024-02-01 ENCOUNTER — PATIENT OUTREACH (OUTPATIENT)
Dept: OBGYN CLINIC | Facility: CLINIC | Age: 34
End: 2024-02-01

## 2024-02-01 NOTE — PROGRESS NOTES
ONESIMO BLUM attempted to f/u today with Carolyne by phone to see where I can mail her information on oscar care through the hospital.
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
## 2024-02-14 ENCOUNTER — OFFICE VISIT (OUTPATIENT)
Dept: FAMILY MEDICINE CLINIC | Facility: CLINIC | Age: 34
End: 2024-02-14

## 2024-02-14 VITALS
HEART RATE: 71 BPM | OXYGEN SATURATION: 98 % | HEIGHT: 66 IN | SYSTOLIC BLOOD PRESSURE: 100 MMHG | BODY MASS INDEX: 30.22 KG/M2 | TEMPERATURE: 98.4 F | WEIGHT: 188 LBS | DIASTOLIC BLOOD PRESSURE: 64 MMHG

## 2024-02-14 DIAGNOSIS — E04.9 GOITER: ICD-10-CM

## 2024-02-14 DIAGNOSIS — E66.9 OBESITY (BMI 30.0-34.9): ICD-10-CM

## 2024-02-14 DIAGNOSIS — Z72.0 CURRENT EVERY DAY NICOTINE VAPING: ICD-10-CM

## 2024-02-14 DIAGNOSIS — N75.0 BARTHOLIN GLAND CYST: ICD-10-CM

## 2024-02-14 DIAGNOSIS — F12.90 MARIJUANA SMOKER: ICD-10-CM

## 2024-02-14 DIAGNOSIS — F41.9 ANXIETY: ICD-10-CM

## 2024-02-14 DIAGNOSIS — Z00.00 ANNUAL PHYSICAL EXAM: Primary | ICD-10-CM

## 2024-02-14 PROBLEM — E66.811 OBESITY (BMI 30.0-34.9): Status: ACTIVE | Noted: 2024-02-14

## 2024-02-14 PROCEDURE — 99395 PREV VISIT EST AGE 18-39: CPT | Performed by: FAMILY MEDICINE

## 2024-02-14 RX ORDER — CITALOPRAM HYDROBROMIDE 10 MG/1
10 TABLET ORAL DAILY
Qty: 30 TABLET | Refills: 0 | Status: SHIPPED | OUTPATIENT
Start: 2024-02-14 | End: 2024-03-15

## 2024-02-14 NOTE — ASSESSMENT & PLAN NOTE
"Patient self medicates with marijuana smoking for anxiety.  She is looking to stop marijuana smoking but is concerned about her anxiety symptoms.  She denies ever being on an anxiolytic but would like to start an oral medication.    Plan:  -Encouraged and supported decision to stop marijuana smoking  -See \"Anxiety\" for medical management  "

## 2024-02-14 NOTE — ASSESSMENT & PLAN NOTE
Reports having feelings of increased anxiety for which she self medicates with marijuana smoking.  She smokes marijuana about every day but would like to stop and trial an oral medication.  NI-7 score: 16    Patient is not been on any antidepressants or anxiolytics in the past.  She is also unaware of any family history of MDD or NI.  She has a negative PHQ today.    NI-7 Flowsheet Screening      Flowsheet Row Most Recent Value   Over the last 2 weeks, how often have you been bothered by any of the following problems?    Feeling nervous, anxious, or on edge 3   Not being able to stop or control worrying 3   Worrying too much about different things 3   Trouble relaxing 3   Being so restless that it is hard to sit still 2   Becoming easily annoyed or irritable 1   Feeling afraid as if something awful might happen 1   NI-7 Total Score 16             Plan:  -Start Celexa 10 mg daily  -Follow-up in 3 weeks  -Counseled on side effects and time to affect for SSRIs  -Counseled on cessation of marijuana smoking

## 2024-02-14 NOTE — ASSESSMENT & PLAN NOTE
Patient reports being in every day nicotine vaper.  She states that she reaches for her vape several times throughout the day.  She does not want to quit at this time.  She had been a cigarette smoker and transition to vaping.    Plan:  -Given tobacco cessation counseling  -Encouraged her that, when she is ready, we do have options to assist her

## 2024-02-14 NOTE — ASSESSMENT & PLAN NOTE
Patient now follows with OB/GYN for Bartholin gland cyst management.  She has an upcoming appointment at the end of February for excision.  She denies pain or drainage from Bartholin gland cyst at this time.  She states that it has remained about the same size since having it drained in December.    Plan:  -Continue follow-up with OB/GYN  -Use warm compresses and OTC pain relievers as needed

## 2024-02-14 NOTE — ASSESSMENT & PLAN NOTE
Patient's Body mass index is 30.81 kg/m².   Patient reports increased weight over the past 3 months.  Since December, she has gained about 6 pounds per chart checking.  She reports about 15 to 20 pound weight increase since November.  She denies changing dietary/nutritional practices but does note that she has decreased activity since November.  Of note, patient has been told that she has a goiter but denies knowledge of prior thyroid testing.    Plan:  -Encouraged increasing physical activity slowly today about 150 minutes of moderate intensity exercise per week.  Encouraged setting SMART goals.  -Encouraged decrease snacking, and increasing vegetable/fruit intake to about 5 servings daily.  -Will check lipid panel, CMP, TSH

## 2024-02-14 NOTE — ASSESSMENT & PLAN NOTE
Patient reports being told that she had a goiter.  Per chart checking, CT in August demonstrated a goiter.  She denies knowledge of history of thyroid issues.  She does however note that she has been gaining weight fairly rapidly since November.    Plan:  -Obtain TSH with reflex  -Consider imaging/medication

## 2024-02-14 NOTE — ASSESSMENT & PLAN NOTE
"Patient presents the office for annual physical examination.  He has concerns regarding weight gain and anxiety.  She has been following with OB/GYN for her Bartholin gland cyst and is scheduled for Pap smear in March.  She states that her last cervical cancer screening was \"years and years ago\".  Patient does report smoking marijuana daily for anxiety and wants to quit.  "

## 2024-02-29 ENCOUNTER — TELEPHONE (OUTPATIENT)
Dept: OBGYN CLINIC | Facility: CLINIC | Age: 34
End: 2024-02-29

## 2024-03-04 ENCOUNTER — ANNUAL EXAM (OUTPATIENT)
Dept: OBGYN CLINIC | Facility: CLINIC | Age: 34
End: 2024-03-04

## 2024-03-04 VITALS
SYSTOLIC BLOOD PRESSURE: 107 MMHG | HEART RATE: 72 BPM | BODY MASS INDEX: 29.83 KG/M2 | WEIGHT: 182 LBS | DIASTOLIC BLOOD PRESSURE: 75 MMHG

## 2024-03-04 DIAGNOSIS — Z30.09 UNWANTED FERTILITY: ICD-10-CM

## 2024-03-04 DIAGNOSIS — Z01.419 ENCOUNTER FOR GYNECOLOGICAL EXAMINATION WITHOUT ABNORMAL FINDING: Primary | ICD-10-CM

## 2024-03-04 DIAGNOSIS — Z23 NEED FOR HPV VACCINATION: ICD-10-CM

## 2024-03-04 DIAGNOSIS — Z12.39 ENCOUNTER FOR BREAST CANCER SCREENING USING NON-MAMMOGRAM MODALITY: ICD-10-CM

## 2024-03-04 DIAGNOSIS — Z11.51 SCREENING FOR HPV (HUMAN PAPILLOMAVIRUS): ICD-10-CM

## 2024-03-04 DIAGNOSIS — Z12.4 SCREENING FOR CERVICAL CANCER: ICD-10-CM

## 2024-03-04 PROBLEM — E66.811 OBESITY (BMI 30.0-34.9): Status: RESOLVED | Noted: 2024-02-14 | Resolved: 2024-03-04

## 2024-03-04 PROBLEM — F12.90 MARIJUANA SMOKER: Status: RESOLVED | Noted: 2024-02-14 | Resolved: 2024-03-04

## 2024-03-04 PROBLEM — N75.0 BARTHOLIN GLAND CYST: Status: RESOLVED | Noted: 2024-02-14 | Resolved: 2024-03-04

## 2024-03-04 PROBLEM — F41.9 ANXIETY: Status: RESOLVED | Noted: 2024-02-14 | Resolved: 2024-03-04

## 2024-03-04 PROBLEM — Z72.0 CURRENT EVERY DAY NICOTINE VAPING: Status: RESOLVED | Noted: 2024-02-14 | Resolved: 2024-03-04

## 2024-03-04 PROBLEM — E66.9 OBESITY (BMI 30.0-34.9): Status: RESOLVED | Noted: 2024-02-14 | Resolved: 2024-03-04

## 2024-03-04 PROBLEM — E04.9 GOITER: Status: RESOLVED | Noted: 2024-02-14 | Resolved: 2024-03-04

## 2024-03-04 PROBLEM — Z00.00 ANNUAL PHYSICAL EXAM: Status: RESOLVED | Noted: 2024-02-14 | Resolved: 2024-03-04

## 2024-03-04 PROCEDURE — 99395 PREV VISIT EST AGE 18-39: CPT | Performed by: NURSE PRACTITIONER

## 2024-03-04 PROCEDURE — 90651 9VHPV VACCINE 2/3 DOSE IM: CPT | Performed by: NURSE PRACTITIONER

## 2024-03-04 PROCEDURE — 90471 IMMUNIZATION ADMIN: CPT | Performed by: NURSE PRACTITIONER

## 2024-03-04 PROCEDURE — G0476 HPV COMBO ASSAY CA SCREEN: HCPCS | Performed by: NURSE PRACTITIONER

## 2024-03-04 PROCEDURE — G0124 SCREEN C/V THIN LAYER BY MD: HCPCS | Performed by: PATHOLOGY

## 2024-03-04 PROCEDURE — G0145 SCR C/V CYTO,THINLAYER,RESCR: HCPCS | Performed by: PATHOLOGY

## 2024-03-04 RX ORDER — MEDROXYPROGESTERONE ACETATE 150 MG/ML
150 INJECTION, SUSPENSION INTRAMUSCULAR
Qty: 1 ML | Refills: 3 | Status: SHIPPED | OUTPATIENT
Start: 2024-03-04

## 2024-03-04 NOTE — PROGRESS NOTES
ANNUAL GYNECOLOGICAL EXAMINATION    Carolyne Hdz is a 33 y.o. female who presents today for annual GYN exam.  Her last pap smear was performed more than 5 years ago and result was WNL per patient.  She reports no history of abnormal pap smears in her past.  I have no documentation of HIV screening in her past.  She reports menses as regular.  Patient's last menstrual period was 2024 (approximate).  Her general medical history has been reviewed and she reports it as follows:    Past Medical History:   Diagnosis Date    Anxiety     Bartholin's gland cyst     req'd I&D multiple times     Depression     Goiter     Migraine      Past Surgical History:   Procedure Laterality Date    NO PAST SURGERIES       OB History          3    Para   2    Term   2       0    AB   1    Living   2         SAB   0    IAB   1    Ectopic   0    Multiple   0    Live Births   2               Social History     Tobacco Use    Smoking status: Former     Types: E-Cigarettes     Passive exposure: Current    Smokeless tobacco: Never   Vaping Use    Vaping status: Never Used   Substance Use Topics    Alcohol use: Not Currently     Comment: couple times/year    Drug use: Yes     Types: Marijuana     Comment: couple times/month     Social History     Substance and Sexual Activity   Sexual Activity Not Currently    Partners: Male    Birth control/protection: Condom Male     Cancer-related family history includes Cervical cancer in her mother; Ovarian cancer in her mother. There is no history of Breast cancer or Colon cancer.    Current Outpatient Medications   Medication Instructions    citalopram (CELEXA) 10 mg, Oral, Daily       Review of Systems:  Review of Systems   Constitutional: Negative.    Gastrointestinal: Negative.    Genitourinary:  Negative for difficulty urinating, menstrual problem, pelvic pain and vaginal discharge.        R Bartholin's gland cyst   Skin: Negative.        Physical Exam:  /75    Pulse 72   Wt 82.6 kg (182 lb)   LMP 02/04/2024 (Approximate)   BMI 29.83 kg/m²   Physical Exam  Constitutional:       General: She is not in acute distress.     Appearance: She is well-developed.   Genitourinary:      Vulva normal.      No lesions in the vagina.      Right Labia: Bartholin's cyst (nontender, 4cm).          Right Adnexa: not tender and no mass present.     Left Adnexa: not tender and no mass present.     No cervical motion tenderness or lesion.      Uterus is not tender.   Breasts:     Right: No mass, nipple discharge, skin change or tenderness.      Left: No mass, nipple discharge, skin change or tenderness.   Neck:      Thyroid: No thyromegaly.   Cardiovascular:      Rate and Rhythm: Normal rate and regular rhythm.   Pulmonary:      Effort: Pulmonary effort is normal.   Abdominal:      Palpations: Abdomen is soft.      Tenderness: There is no abdominal tenderness.   Musculoskeletal:      Cervical back: Neck supple.   Neurological:      Mental Status: She is alert and oriented to person, place, and time.   Skin:     General: Skin is warm and dry.   Vitals reviewed.       Assessment/Plan:   1. Normal well-woman GYN exam.  2. Cervical cancer screening:  Normal cervical exam.  Pap smear done with HPV co-testing.  Has not received HPV vaccine in the past, but she desires to initiate vaccine series now.  Given HPV vaccine today and she will return in 2 and 6 months for subsequent vaccinations.     3. STD screening:  Patient declines due to no insurance.   4. Breast cancer screening:  Normal breast exam.  Reviewed breast self-awareness.   5. Depression Screening: Patient's depression screening was assessed with a PHQ-2 score of 6. Their PHQ-9 score was 18. Continue regular follow-up with their psychologist/therapist/psychiatrist who is managing their mental health condition(s).   6. BMI Counseling: Body mass index is 29.83 kg/m².  No intervention indicated.   7. Contraception:  Desires Depoprovera.   Rx sent to pharmacy and advised to return for injection tomorrow.   8. Return to office in 1 year for annual GYN exam.

## 2024-03-04 NOTE — PROGRESS NOTES
HPV given to patient in right deltoid on 3/4/2024    NDC: 4204-3046-41  LOT: [id number]  EXP: 01/26/2026

## 2024-03-05 ENCOUNTER — CLINICAL SUPPORT (OUTPATIENT)
Dept: OBGYN CLINIC | Facility: CLINIC | Age: 34
End: 2024-03-05

## 2024-03-05 ENCOUNTER — TELEPHONE (OUTPATIENT)
Dept: FAMILY MEDICINE CLINIC | Facility: CLINIC | Age: 34
End: 2024-03-05

## 2024-03-05 VITALS
HEART RATE: 84 BPM | BODY MASS INDEX: 29.63 KG/M2 | HEIGHT: 66 IN | DIASTOLIC BLOOD PRESSURE: 71 MMHG | SYSTOLIC BLOOD PRESSURE: 102 MMHG | WEIGHT: 184.4 LBS

## 2024-03-05 DIAGNOSIS — Z30.09 UNWANTED FERTILITY: Primary | ICD-10-CM

## 2024-03-05 LAB
HPV HR 12 DNA CVX QL NAA+PROBE: POSITIVE
HPV16 DNA CVX QL NAA+PROBE: NEGATIVE
HPV18 DNA CVX QL NAA+PROBE: NEGATIVE
SL AMB POCT URINE HCG: NORMAL

## 2024-03-05 PROCEDURE — 96372 THER/PROPH/DIAG INJ SC/IM: CPT

## 2024-03-05 PROCEDURE — 81025 URINE PREGNANCY TEST: CPT

## 2024-03-05 RX ORDER — MEDROXYPROGESTERONE ACETATE 150 MG/ML
150 INJECTION, SUSPENSION INTRAMUSCULAR ONCE
Status: COMPLETED | OUTPATIENT
Start: 2024-03-05 | End: 2024-03-05

## 2024-03-05 RX ADMIN — MEDROXYPROGESTERONE ACETATE 150 MG: 150 INJECTION, SUSPENSION INTRAMUSCULAR at 11:25

## 2024-03-05 NOTE — TELEPHONE ENCOUNTER
Called and left message for patient to complete labs before her appointment tomorrow, please review thank you

## 2024-03-06 ENCOUNTER — TELEPHONE (OUTPATIENT)
Dept: FAMILY MEDICINE CLINIC | Facility: CLINIC | Age: 34
End: 2024-03-06

## 2024-03-08 ENCOUNTER — TELEPHONE (OUTPATIENT)
Dept: OBGYN CLINIC | Facility: CLINIC | Age: 34
End: 2024-03-08

## 2024-03-08 LAB
LAB AP GYN PRIMARY INTERPRETATION: ABNORMAL
Lab: ABNORMAL
PATH INTERP SPEC-IMP: ABNORMAL

## 2024-03-12 NOTE — TELEPHONE ENCOUNTER
I contacted patient by phone and explained to her that pap smear was abnormal and colposcopy is recommended.  She verbalizes understanding.  I will have my office staff contact her to schedule colposcopy.

## 2024-03-21 ENCOUNTER — OFFICE VISIT (OUTPATIENT)
Dept: OBGYN CLINIC | Facility: CLINIC | Age: 34
End: 2024-03-21

## 2024-03-21 VITALS
WEIGHT: 185.8 LBS | BODY MASS INDEX: 29.86 KG/M2 | DIASTOLIC BLOOD PRESSURE: 76 MMHG | HEART RATE: 99 BPM | SYSTOLIC BLOOD PRESSURE: 106 MMHG | HEIGHT: 66 IN

## 2024-03-21 DIAGNOSIS — N75.0 BARTHOLIN'S CYST: Primary | ICD-10-CM

## 2024-03-21 PROCEDURE — 99213 OFFICE O/P EST LOW 20 MIN: CPT | Performed by: OBSTETRICS & GYNECOLOGY

## 2024-03-21 NOTE — PROGRESS NOTES
"Problem Visit     SUBJECTIVE:  HPI: Carolyne Hdz is a 33 y.o.  female who presents to discuss Marsupialization of Bartholin cyst. The patient has a h/o I&D 3x without resolution.    Reviewed the goal of a marsupialization, which is to (1) remove the fluid that has collected in the gland/cyst and (2) allow the cyst lining to be exposed so that no additional fluid can collect and so it will undergo epithelialization and stop producing fluid.    Reviewed surgical risks and benefits of the marsupialization of her Bartholin's cyst.  These include but are not limited to bleeding, infection, injury to the vulva, vaginal, or surrounding tissues, and/or recurrence of the cyst.  The patient expressed understanding of the risks involved, all questions were answered, the patient consented to the procedure.    Past Medical History:   Diagnosis Date    Abnormal Pap smear of cervix     3/2024 ASCUS pap/+ other HRHPV    Anxiety     Bartholin's gland cyst     req'd I&D multiple times     Depression     Goiter     Migraine        Past Surgical History:   Procedure Laterality Date    NO PAST SURGERIES         Social History     Tobacco Use    Smoking status: Former     Types: E-Cigarettes     Passive exposure: Current    Smokeless tobacco: Never   Vaping Use    Vaping status: Never Used   Substance Use Topics    Alcohol use: Not Currently     Comment: couple times/year    Drug use: Yes     Types: Marijuana     Comment: couple times/month         Current Outpatient Medications:     citalopram (CeleXA) 10 mg tablet, Take 1 tablet (10 mg total) by mouth daily, Disp: 30 tablet, Rfl: 0    medroxyPROGESTERone (DEPO-PROVERA) 150 mg/mL injection, Inject 1 mL (150 mg total) into a muscle every 3 (three) months, Disp: 1 mL, Rfl: 3      OBJECTIVE:  Vitals:    24 1435   BP: 106/76   BP Location: Left arm   Patient Position: Sitting   Cuff Size: Extra-Large   Pulse: 99   Weight: 84.3 kg (185 lb 12.8 oz)   Height: 5' 5.5\" " "(1.664 m)       Physical Exam  Constitutional:       General: She is not in acute distress.     Appearance: Normal appearance. She is not ill-appearing.   Eyes:      General: No scleral icterus.        Right eye: No discharge.         Left eye: No discharge.   Cardiovascular:      Pulses: Normal pulses.   Pulmonary:      Effort: Pulmonary effort is normal. No respiratory distress.   Abdominal:      General: There is no distension.      Palpations: There is no mass.      Tenderness: There is no abdominal tenderness. There is no guarding or rebound.   Genitourinary:     Pubic Area: No rash.       Labia:         Right: Tenderness (4x3cm fluctuant mass at the Bartholin's gland) present. No rash.         Left: No rash.    Musculoskeletal:      Right lower leg: No edema.      Left lower leg: No edema.   Skin:     General: Skin is warm and dry.      Findings: No rash.   Neurological:      Mental Status: She is alert.       Depression Screening Follow-up Plan: Patient's depression screening was positive with a PHQ-2 score of 3. Their PHQ-9 score was 15. Of note, when asked about \"Thoughts that you would be better off dead or of hurting yourself in some way,\" the patient responded that over the last two weeks, she's felt this way for \"Several Days.\" Carolyne reports that she feels this way mostly because hasn't seen her daughters and she has been fighting with her ex-. She feels like she's disappointed her family. She hasn't seen her daughters since 2019; they are with her sister in [place]. Her mother passed in 2019 and her father is in his 90s. Whenever she talks with him on the phone, she has to remind him who she is. She is worried that, when he eventually passes away, she won't be able to go to [place] for his .    She says that she's beating herself up. She says she is not suicidal; she is stronger than that. Sometimes she says she \"breaks down.\" While in the [place], she has not tried to hurt herself. She " reports that in her early 20s, she took a bunch of pills in [place]; she was diagnosed with depression. She does not currently have a mental health care provider, but she is interested in obtaining one. She identifies her daughters as protective factors. Encouraged the patient to call the office, call the ED, or present to the ED if ever her thoughts increase in intensity or frequency, or if she starts to develop a plan. Informed patient that we appreciate being able to take care of her, and we want her to feel safe. She expresses understanding.    Patient assessed for underlying major depression. They have no active suicidal ideations. Brief counseling provided and recommend additional follow-up/re-evaluation next office visit.    SW to talk with patient at scheduled colposcopy on 24.        ASSESSMENT/PLAN:  Problem List          Genitourinary    Bartholin's cyst       Carolyne Hdz is a 33 y.o.  female who presents to discuss Marsupialization. Surgical H&P signed. Pending results of colposcopy, will move forward with scheduling vs. adjust surgical H&P to include LEEP.    SW to discuss mental status and financial difficulties with patient at next appointment in 2 weeks.      Bella Gomes MD   PGY-4, OBGYN  24 1:18 PM

## 2024-04-04 ENCOUNTER — PROCEDURE VISIT (OUTPATIENT)
Dept: OBGYN CLINIC | Facility: CLINIC | Age: 34
End: 2024-04-04

## 2024-04-04 VITALS
DIASTOLIC BLOOD PRESSURE: 65 MMHG | HEIGHT: 66 IN | BODY MASS INDEX: 30.5 KG/M2 | SYSTOLIC BLOOD PRESSURE: 99 MMHG | WEIGHT: 189.8 LBS | HEART RATE: 76 BPM

## 2024-04-04 DIAGNOSIS — R87.610 ATYPICAL SQUAMOUS CELLS OF UNDETERMINED SIGNIFICANCE ON CYTOLOGIC SMEAR OF CERVIX (ASC-US): Primary | ICD-10-CM

## 2024-04-04 LAB — SL AMB POCT URINE HCG: NEGATIVE

## 2024-04-04 PROCEDURE — 57456 ENDOCERV CURETTAGE W/SCOPE: CPT | Performed by: OBSTETRICS & GYNECOLOGY

## 2024-04-04 PROCEDURE — 88305 TISSUE EXAM BY PATHOLOGIST: CPT | Performed by: PATHOLOGY

## 2024-04-04 PROCEDURE — 81025 URINE PREGNANCY TEST: CPT | Performed by: OBSTETRICS & GYNECOLOGY

## 2024-04-04 RX ORDER — LIDOCAINE HYDROCHLORIDE AND EPINEPHRINE BITARTRATE 20; .01 MG/ML; MG/ML
10 INJECTION, SOLUTION SUBCUTANEOUS ONCE
Status: COMPLETED | OUTPATIENT
Start: 2024-04-04 | End: 2024-04-04

## 2024-04-04 RX ADMIN — LIDOCAINE HYDROCHLORIDE AND EPINEPHRINE BITARTRATE 10 ML: 20; .01 INJECTION, SOLUTION SUBCUTANEOUS at 15:15

## 2024-04-04 NOTE — PROGRESS NOTES
"   Colposcopy     Date/Time  4/4/2024 2:00 PM     [place] Protocol   Procedure performed by: (Dr. Narayanan)  Consent: Verbal consent obtained. Written consent obtained.  Risks and benefits: risks, benefits and alternatives were discussed  Consent given by: patient  Time out: Immediately prior to procedure a \"time out\" was called to verify the correct patient, procedure, equipment, support staff and site/side marked as required.  Patient understanding: patient states understanding of the procedure being performed  Patient consent: the patient's understanding of the procedure matches consent given  Procedure consent: procedure consent matches procedure scheduled  Relevant documents: relevant documents present and verified  Test results: test results available and properly labeled  Site marked: the operative site was not marked  Radiology Images displayed and confirmed. If images not available, report reviewed: imaging studies not available  Required items: required blood products, implants, devices, and special equipment available  Patient identity confirmed: verbally with patient     Performed by  Carmencita Montague MD   Authorized by  Carmencita Montague MD     Pre-procedure details      Pre-procedure timeout performed: yes      Prepped with: acetic acid      Local anesthetic:  Lidocaine WITH epinephrine   Indication    ASC-US   Procedure Details   Procedure: Colposcopy w/ endocervical curettage      Under satisfactory analgesia the patient was prepped and draped in the dorsal lithotomy position: yes      [place] speculum was placed in the vagina: yes      Under colposcopic examination the transition zone was seen in entirety: yes      Intracervical block was performed: yes      Endocervix was curetted using a Kevorkian curette: yes      Tampon inserted: no      Monsel's solution was applied: yes      Biopsy(s): yes      Location:  12:00 and 6:00    Specimen to pathology: yes     Post-procedure      " Findings: Mosaicism and White epithelium      Impression: Low grade cervical dysplasia      Patient tolerance of procedure:  Tolerated well, no immediate complications   Comments       Plan for patient to follow up in 2 weeks for discussion of results and H&P visit for surgery for marsupilization +/- LEEP. Reviewed return precautions and restrictions including, nothing in the vagina for 2 weeks and f/u if bleeding is significant.        Carmencita Montague MD  OBGYN PGY-3  04/04/24 3:14 PM

## 2024-04-08 PROCEDURE — 88305 TISSUE EXAM BY PATHOLOGIST: CPT | Performed by: PATHOLOGY

## 2024-04-22 ENCOUNTER — PATIENT OUTREACH (OUTPATIENT)
Facility: HOSPITAL | Age: 34
End: 2024-04-22

## 2024-04-22 NOTE — PROGRESS NOTES
"Program details reviewed (Yes/No): Yes    Patient lives in [place]: (Yes/No): Yes    Uninsured/Underinsured(List): Uninsured    Patient Agreeable to Participation(Yes/No): Yes    Verbal Consent Given (Yes/No): Yes    [facility] Consent form signed \"verba consent\"(Yes/No): Yes    Patient Entered into [facility] (Yes/No): Yes      "

## 2024-04-24 ENCOUNTER — OFFICE VISIT (OUTPATIENT)
Dept: FAMILY MEDICINE CLINIC | Facility: CLINIC | Age: 34
End: 2024-04-24

## 2024-04-24 VITALS
HEIGHT: 66 IN | SYSTOLIC BLOOD PRESSURE: 109 MMHG | BODY MASS INDEX: 30.37 KG/M2 | WEIGHT: 189 LBS | OXYGEN SATURATION: 98 % | DIASTOLIC BLOOD PRESSURE: 77 MMHG | TEMPERATURE: 98.2 F | RESPIRATION RATE: 18 BRPM

## 2024-04-24 DIAGNOSIS — Z59.9 FINANCIAL DIFFICULTIES: ICD-10-CM

## 2024-04-24 DIAGNOSIS — F41.9 ANXIETY: Primary | ICD-10-CM

## 2024-04-24 DIAGNOSIS — Z59.819 HOUSING INSTABILITY: ICD-10-CM

## 2024-04-24 PROCEDURE — 99213 OFFICE O/P EST LOW 20 MIN: CPT | Performed by: FAMILY MEDICINE

## 2024-04-24 RX ORDER — CITALOPRAM HYDROBROMIDE 10 MG/1
10 TABLET ORAL DAILY
Qty: 30 TABLET | Refills: 0 | Status: SHIPPED | OUTPATIENT
Start: 2024-04-24 | End: 2024-05-24

## 2024-04-24 SDOH — ECONOMIC STABILITY - INCOME SECURITY: PROBLEM RELATED TO HOUSING AND ECONOMIC CIRCUMSTANCES, UNSPECIFIED: Z59.9

## 2024-04-24 SDOH — ECONOMIC STABILITY - HOUSING INSECURITY: HOUSING INSTABILITY UNSPECIFIED: Z59.819

## 2024-04-24 NOTE — ASSESSMENT & PLAN NOTE
Reports having feelings of increased anxiety for which she occasionally self medicates with marijuana smoking.  She has decreased her marijuana smoking.  She compliant with on her Celexa started at last visit.  She states that she will forget about the medication and take it every once in a while.  Explained to patient that this medication is to be taken daily to reach therapeutic effect.         Plan:  -Refill Celexa 10 mg  -Follow-up in 3 weeks  -Counseled on side effects and time to affect for SSRIs  -Counseled on cessation of marijuana smoking

## 2024-04-24 NOTE — PROGRESS NOTES
Name: Carolyne Hdz      : 1990      MRN: [id number]  Encounter Provider: Johan Avilez DO  Encounter Date: 2024   Encounter department: [facility]    Assessment & Plan     1. Anxiety  Assessment & Plan:  Reports having feelings of increased anxiety for which she occasionally self medicates with marijuana smoking.  She has decreased her marijuana smoking.  She compliant with on her Celexa started at last visit.  She states that she will forget about the medication and take it every once in a while.  Explained to patient that this medication is to be taken daily to reach therapeutic effect.         Plan:  -Refill Celexa 10 mg  -Follow-up in 3 weeks  -Counseled on side effects and time to affect for SSRIs  -Counseled on cessation of marijuana smoking    Orders:  -     citalopram (CeleXA) 10 mg tablet; Take 1 tablet (10 mg total) by mouth daily    2. Financial difficulties  Assessment & Plan:  Patient endorses financial and housing instability.    Plan:  -Referred to and following with social work    Orders:  -     Ambulatory referral to social work care management program; Future; Expected date: 2024    3. Housing instability  Assessment & Plan:  Patient endorses financial and housing instability.    Plan:  -Referred to and following with social work    Orders:  -     Ambulatory referral to social work care management program; Future; Expected date: 2024           Subjective     Patient is a 33-year-old female who presents to the clinic today with concerns for follow up for anxiety and from recent specialist appointments.    Reports that her anxiety has been slightly improved since last time.  She was prescribed Celexa 10 mg at last visit, however, she has not been compliant with this medication daily.  She states that she will forget to take it and only gets it in 2-4 times a week.  She does ports that her marijuana smoking is decreased in the  meantime however.  She is interested in continuing on Celexa.    She has been following with GYN for gland cyst.  She is scheduled on 5/10 for marsupialization.  She will be seeing them on 4/25 for preop visit with them.      Review of Systems   Constitutional:  Negative for chills, fatigue and fever.   Eyes:  Negative for pain and visual disturbance.   Respiratory:  Negative for cough, shortness of breath and wheezing.    Cardiovascular:  Negative for chest pain and palpitations.   Gastrointestinal:  Negative for abdominal pain, nausea and vomiting.   Genitourinary:  Negative for dysuria, flank pain, hematuria, urgency and vaginal discharge.   Musculoskeletal:  Negative for arthralgias and back pain.   Skin:  Negative for color change and rash.   Neurological:  Negative for dizziness, seizures, syncope, weakness, light-headedness and headaches.   Psychiatric/Behavioral:  Negative for self-injury, sleep disturbance and suicidal ideas. The patient is nervous/anxious.    All other systems reviewed and are negative.      Past Medical History:   Diagnosis Date    Abnormal Pap smear of cervix     3/2024 ASCUS pap/+ other HRHPV    Anxiety     Bartholin's gland cyst     req'd I&D multiple times 2023/2024    Depression     Goiter     Migraine      Past Surgical History:   Procedure Laterality Date    NO PAST SURGERIES       Family History   Problem Relation Age of Onset    Ovarian cancer Mother     Cervical cancer Mother     No Known Problems Father     Breast cancer Neg Hx     Colon cancer Neg Hx      Social History     Socioeconomic History    Marital status: Single     Spouse name: None    Number of children: 2    Years of education: None    Highest education level: None   Occupational History    None   Tobacco Use    Smoking status: Every Day     Types: E-Cigarettes     Passive exposure: Current    Smokeless tobacco: Never   Vaping Use    Vaping status: Some Days    Substances: Nicotine, Flavoring   Substance and Sexual  Activity    Alcohol use: Not Currently     Comment: couple times/year    Drug use: Yes     Types: Marijuana     Comment: couple times/month    Sexual activity: Yes     Partners: Male     Birth control/protection: Condom Male, Injection   Other Topics Concern    None   Social History Narrative    None     Social Determinants of Health     Financial Resource Strain: High Risk (4/24/2024)    Overall Financial Resource Strain (CARDIA)     Difficulty of Paying Living Expenses: Very hard   Food Insecurity: Food Insecurity Present (4/24/2024)    Hunger Vital Sign     Worried About Running Out of Food in the Last Year: Often true     Ran Out of Food in the Last Year: Often true   Transportation Needs: Unmet Transportation Needs (4/4/2024)    PRAPARE - Transportation     Lack of Transportation (Medical): Yes     Lack of Transportation (Non-Medical): Yes   Physical Activity: Inactive (4/24/2024)    Exercise Vital Sign     Days of Exercise per Week: 0 days     Minutes of Exercise per Session: 0 min   Stress: Stress Concern Present (4/24/2024)    [employer] - Occupational Stress Questionnaire     Feeling of Stress : To some extent   Social Connections: Socially Isolated (12/13/2023)    Social Connection and Isolation Panel [NHANES]     Frequency of Communication with Friends and Family: More than three times a week     Frequency of Social Gatherings with Friends and Family: More than three times a week     Attends [employer] Services: Never     Active Member of Clubs or Organizations: No     Attends Club or Organization Meetings: Never     Marital Status: Never    Intimate Partner Violence: Not At Risk (4/24/2024)    Humiliation, Afraid, Rape, and Kick questionnaire     Fear of Current or Ex-Partner: No     Emotionally Abused: No     Physically Abused: No     Sexually Abused: No   Housing Stability: High Risk (4/24/2024)    Housing Stability Vital Sign     Unable to Pay for Housing in the  "Last Year: Yes     Number of Places Lived in the Last Year: 1     Unstable Housing in the Last Year: Yes     Current Outpatient Medications on File Prior to Visit   Medication Sig    [DISCONTINUED] citalopram (CeleXA) 10 mg tablet Take 1 tablet (10 mg total) by mouth daily    medroxyPROGESTERone (DEPO-PROVERA) 150 mg/mL injection Inject 1 mL (150 mg total) into a muscle every 3 (three) months     No Known Allergies  Immunization History   Administered Date(s) Administered    HPV9 03/04/2024    Tdap 08/01/2023       Objective     /77 (BP Location: Left arm, Patient Position: Sitting, Cuff Size: Standard)   Temp 98.2 °F (36.8 °C) (Temporal)   Resp 18   Ht 5' 5.5\" (1.664 m)   Wt 85.7 kg (189 lb)   LMP 03/25/2024 (Approximate)   SpO2 98%   BMI 30.97 kg/m²     Physical Exam  Constitutional:       General: She is not in acute distress.     Appearance: Normal appearance.   HENT:      Right Ear: External ear normal.      Left Ear: External ear normal.      Nose: Nose normal.      Mouth/Throat:      Mouth: Mucous membranes are moist.      Pharynx: Oropharynx is clear.   Eyes:      Extraocular Movements: Extraocular movements intact.      Conjunctiva/sclera: Conjunctivae normal.   Cardiovascular:      Rate and Rhythm: Normal rate and regular rhythm.      Heart sounds: Normal heart sounds.   Pulmonary:      Effort: Pulmonary effort is normal.      Breath sounds: Normal breath sounds.   Abdominal:      General: Abdomen is flat. Bowel sounds are normal.      Palpations: Abdomen is soft.   Skin:     General: Skin is warm and dry.      Capillary Refill: Capillary refill takes less than 2 seconds.   Neurological:      Mental Status: She is alert and oriented to person, place, and time. Mental status is at baseline.   Psychiatric:         Mood and Affect: Mood is anxious.         Behavior: Behavior normal.       Johan Avilez, DO    "

## 2024-04-24 NOTE — ASSESSMENT & PLAN NOTE
Patient endorses financial and housing instability.    Plan:  -Referred to and following with social work

## 2024-04-25 ENCOUNTER — PATIENT OUTREACH (OUTPATIENT)
Dept: PEDIATRICS CLINIC | Facility: CLINIC | Age: 34
End: 2024-04-25

## 2024-04-25 ENCOUNTER — OFFICE VISIT (OUTPATIENT)
Dept: OBGYN CLINIC | Facility: CLINIC | Age: 34
End: 2024-04-25

## 2024-04-25 VITALS
WEIGHT: 185.4 LBS | DIASTOLIC BLOOD PRESSURE: 76 MMHG | SYSTOLIC BLOOD PRESSURE: 108 MMHG | BODY MASS INDEX: 29.8 KG/M2 | HEART RATE: 109 BPM | HEIGHT: 66 IN

## 2024-04-25 DIAGNOSIS — Z59.819 HOUSING INSTABILITY: ICD-10-CM

## 2024-04-25 DIAGNOSIS — Z59.12 INADEQUATE HOUSING UTILITIES: ICD-10-CM

## 2024-04-25 DIAGNOSIS — Z59.9 FINANCIAL DIFFICULTIES: Primary | ICD-10-CM

## 2024-04-25 DIAGNOSIS — Z59.41 FOOD INSECURITY: ICD-10-CM

## 2024-04-25 PROCEDURE — 99213 OFFICE O/P EST LOW 20 MIN: CPT | Performed by: OBSTETRICS & GYNECOLOGY

## 2024-04-25 SDOH — ECONOMIC STABILITY - FOOD INSECURITY: FOOD INSECURITY: Z59.41

## 2024-04-25 SDOH — ECONOMIC STABILITY - INCOME SECURITY: PROBLEM RELATED TO HOUSING AND ECONOMIC CIRCUMSTANCES, UNSPECIFIED: Z59.9

## 2024-04-25 SDOH — ECONOMIC STABILITY - HOUSING INSECURITY: HOUSING INSTABILITY UNSPECIFIED: Z59.819

## 2024-04-25 SDOH — ECONOMIC STABILITY - HOUSING INSECURITY: INADEQUATE HOUSING UTILITIES: Z59.12

## 2024-04-25 NOTE — H&P (VIEW-ONLY)
H&P Exam  Carolyne Hdz 34 y.o. female MRN: [id number]  Unit/Bed#:  Encounter: [id number]      HPI:  Carolyne Hdz is a 34 y.o.  female who will be undergoing Marsupialization of right bartholin's gland cyst  on 5/10/2024.    Carolyne has a history of right bartholin's gland cyst which is s/p I&D x 3 with no resolution of the cyst. She states that the cyst is not painful at this time. Consent was previously signed 3/21/2024 but we again reviewed the risks of procedure including bleeding, infection, injury to surrounding vessels/structures and postoperative pain.    We reviewed her colposcopy results from 2024 which were negative for dysplasia. We reviewed the need for follow up with repeat pap smear in  1 year.     LMP: 2024  Contraception: Depo shot - last shot 3/5/24  Last pap smear: 3/4/2024 ASCUS HPV other HR +, colpo 2024 negative for dysplasia    Blood use during admission if needed: yes  CPR during admission if needed: yes    OB History    Para Term  AB Living   3 2 2 0 1 2   SAB IAB Ectopic Multiple Live Births   0 1 0 0 2      # Outcome Date GA Lbr Marito/2nd Weight Sex Delivery Anes PTL Lv   3 IAB            2 Term            1 Term                Review of Systems   Psychiatric/Behavioral:  The patient is nervous/anxious.    All other systems reviewed and are negative.      Historical Information   Past Medical History:   Diagnosis Date    Abnormal Pap smear of cervix     3/2024 ASCUS pap/+ other HRHPV    Anxiety     Bartholin's gland cyst     req'd I&D multiple times     Depression     Goiter     Migraine      Past Surgical History:   Procedure Laterality Date    NO PAST SURGERIES       Social History   Social History     Substance and Sexual Activity   Alcohol Use Not Currently    Comment: couple times/year     Social History     Substance and Sexual Activity   Drug Use Not Currently    Types: Marijuana    Comment: couple times/month     Social History  "    Tobacco Use   Smoking Status Every Day    Types: E-Cigarettes    Passive exposure: Current   Smokeless Tobacco Never         Meds/Allergies    (Not in a hospital admission)     No Known Allergies    OBJECTIVE:    Vitals: Blood pressure 108/76, pulse (!) 109, height 5' 5.5\" (1.664 m), weight 84.1 kg (185 lb 6.4 oz), last menstrual period 2024.Body mass index is 30.38 kg/m².    Physical Exam  Constitutional:       General: She is not in acute distress.     Appearance: Normal appearance.   HENT:      Head: Normocephalic and atraumatic.   Cardiovascular:      Rate and Rhythm: Normal rate and regular rhythm.   Pulmonary:      Effort: Pulmonary effort is normal.      Breath sounds: Normal breath sounds.   Abdominal:      Palpations: Abdomen is soft.      Tenderness: There is no abdominal tenderness.   Musculoskeletal:      Right lower leg: No tenderness.      Left lower leg: No tenderness.   Skin:     General: Skin is warm and dry.   Neurological:      General: No focal deficit present.      Mental Status: She is alert.   Psychiatric:         Mood and Affect: Mood normal.         Assessment/Plan     ASSESSMENT:  33yo  female who is scheduled for Marsupialization of right bartholin's gland cyst  on 5/10/2024    PLAN:  NPO after midnight  Will return to office in 1 year for repeat pap smear     Yun Quintanilla MD  2024  10:46 AM                "

## 2024-04-25 NOTE — PROGRESS NOTES
H&P Exam  Carolyne Hdz 34 y.o. female MRN: [id number]  Unit/Bed#:  Encounter: [id number]      HPI:  Carolyne Hdz is a 34 y.o.  female who will be undergoing Marsupialization of right bartholin's gland cyst  on 5/10/2024.    Carolyne has a history of right bartholin's gland cyst which is s/p I&D x 3 with no resolution of the cyst. She states that the cyst is not painful at this time. Consent was previously signed 3/21/2024 but we again reviewed the risks of procedure including bleeding, infection, injury to surrounding vessels/structures and postoperative pain.    We reviewed her colposcopy results from 2024 which were negative for dysplasia. We reviewed the need for follow up with repeat pap smear in  1 year.     LMP: 2024  Contraception: Depo shot - last shot 3/5/24  Last pap smear: 3/4/2024 ASCUS HPV other HR +, colpo 2024 negative for dysplasia    Blood use during admission if needed: yes  CPR during admission if needed: yes    OB History    Para Term  AB Living   3 2 2 0 1 2   SAB IAB Ectopic Multiple Live Births   0 1 0 0 2      # Outcome Date GA Lbr Marito/2nd Weight Sex Delivery Anes PTL Lv   3 IAB            2 Term            1 Term                Review of Systems   Psychiatric/Behavioral:  The patient is nervous/anxious.    All other systems reviewed and are negative.      Historical Information   Past Medical History:   Diagnosis Date    Abnormal Pap smear of cervix     3/2024 ASCUS pap/+ other HRHPV    Anxiety     Bartholin's gland cyst     req'd I&D multiple times     Depression     Goiter     Migraine      Past Surgical History:   Procedure Laterality Date    NO PAST SURGERIES       Social History   Social History     Substance and Sexual Activity   Alcohol Use Not Currently    Comment: couple times/year     Social History     Substance and Sexual Activity   Drug Use Not Currently    Types: Marijuana    Comment: couple times/month     Social History  "    Tobacco Use   Smoking Status Every Day    Types: E-Cigarettes    Passive exposure: Current   Smokeless Tobacco Never         Meds/Allergies    (Not in a hospital admission)     No Known Allergies    OBJECTIVE:    Vitals: Blood pressure 108/76, pulse (!) 109, height 5' 5.5\" (1.664 m), weight 84.1 kg (185 lb 6.4 oz), last menstrual period 2024.Body mass index is 30.38 kg/m².    Physical Exam  Constitutional:       General: She is not in acute distress.     Appearance: Normal appearance.   HENT:      Head: Normocephalic and atraumatic.   Cardiovascular:      Rate and Rhythm: Normal rate and regular rhythm.   Pulmonary:      Effort: Pulmonary effort is normal.      Breath sounds: Normal breath sounds.   Abdominal:      Palpations: Abdomen is soft.      Tenderness: There is no abdominal tenderness.   Musculoskeletal:      Right lower leg: No tenderness.      Left lower leg: No tenderness.   Skin:     General: Skin is warm and dry.   Neurological:      General: No focal deficit present.      Mental Status: She is alert.   Psychiatric:         Mood and Affect: Mood normal.         Assessment/Plan     ASSESSMENT:  35yo  female who is scheduled for Marsupialization of right bartholin's gland cyst  on 5/10/2024    PLAN:  NPO after midnight  Will return to office in 1 year for repeat pap smear     Yun Quintanilla MD  2024  10:46 AM                "

## 2024-04-26 NOTE — PROGRESS NOTES
ONESIMO BLUM f/u with Carolyne by phone today. She reports that she is living in her own apartment now and that [employer] is assisting her with the rent at this time. Pt is still working as a HHA. Pt reports that she completed an SFS application for [employer]. ONESIMO BLUM explained that [facility] [employer] has a different but similar process and I advised that she should start the application for [facility] prior to her procedure on 05/10. ONESIMO BLUM emailed her the information for [facility]. Pt confirmed she received the email. ONESIMO BLUM requested she reach back out if she has any difficulties.

## 2024-05-08 ENCOUNTER — ANESTHESIA EVENT (OUTPATIENT)
Dept: PERIOP | Facility: HOSPITAL | Age: 34
End: 2024-05-08
Payer: COMMERCIAL

## 2024-05-10 ENCOUNTER — HOSPITAL ENCOUNTER (OUTPATIENT)
Facility: HOSPITAL | Age: 34
Setting detail: OUTPATIENT SURGERY
Discharge: HOME | End: 2024-05-10
Attending: OBSTETRICS & GYNECOLOGY | Admitting: OBSTETRICS & GYNECOLOGY
Payer: COMMERCIAL

## 2024-05-10 ENCOUNTER — ANESTHESIA (OUTPATIENT)
Dept: PERIOP | Facility: HOSPITAL | Age: 34
End: 2024-05-10
Payer: COMMERCIAL

## 2024-05-10 VITALS
SYSTOLIC BLOOD PRESSURE: 97 MMHG | HEART RATE: 72 BPM | RESPIRATION RATE: 18 BRPM | BODY MASS INDEX: 29.03 KG/M2 | HEIGHT: 67 IN | TEMPERATURE: 98.2 F | WEIGHT: 184.97 LBS | OXYGEN SATURATION: 96 % | DIASTOLIC BLOOD PRESSURE: 57 MMHG

## 2024-05-10 DIAGNOSIS — N75.0 CYST OF BARTHOLIN'S GLAND: ICD-10-CM

## 2024-05-10 DIAGNOSIS — N87.1 MODERATE DYSPLASIA OF CERVIX: ICD-10-CM

## 2024-05-10 PROBLEM — F32.A DEPRESSION: Status: ACTIVE | Noted: 2024-05-10

## 2024-05-10 LAB
EXT PREGNANCY TEST URINE: NEGATIVE
EXT. CONTROL: NORMAL

## 2024-05-10 PROCEDURE — 81025 URINE PREGNANCY TEST: CPT | Performed by: OBSTETRICS & GYNECOLOGY

## 2024-05-10 PROCEDURE — 56440 MRSPLZATN BRTHLNS GLND CST: CPT | Performed by: OBSTETRICS & GYNECOLOGY

## 2024-05-10 RX ORDER — MEPERIDINE HYDROCHLORIDE 25 MG/ML
12.5 INJECTION INTRAMUSCULAR; INTRAVENOUS; SUBCUTANEOUS
Status: DISCONTINUED | OUTPATIENT
Start: 2024-05-10 | End: 2024-05-10 | Stop reason: HOSPADM

## 2024-05-10 RX ORDER — ONDANSETRON 2 MG/ML
4 INJECTION INTRAMUSCULAR; INTRAVENOUS ONCE AS NEEDED
Status: DISCONTINUED | OUTPATIENT
Start: 2024-05-10 | End: 2024-05-10 | Stop reason: HOSPADM

## 2024-05-10 RX ORDER — DEXAMETHASONE SODIUM PHOSPHATE 10 MG/ML
INJECTION, SOLUTION INTRAMUSCULAR; INTRAVENOUS AS NEEDED
Status: DISCONTINUED | OUTPATIENT
Start: 2024-05-10 | End: 2024-05-10

## 2024-05-10 RX ORDER — PROPOFOL 10 MG/ML
INJECTION, EMULSION INTRAVENOUS CONTINUOUS PRN
Status: DISCONTINUED | OUTPATIENT
Start: 2024-05-10 | End: 2024-05-10

## 2024-05-10 RX ORDER — ONDANSETRON 2 MG/ML
4 INJECTION INTRAMUSCULAR; INTRAVENOUS EVERY 6 HOURS PRN
Status: DISCONTINUED | OUTPATIENT
Start: 2024-05-10 | End: 2024-05-10 | Stop reason: HOSPADM

## 2024-05-10 RX ORDER — HYDROMORPHONE HCL/PF 1 MG/ML
0.5 SYRINGE (ML) INJECTION
Status: DISCONTINUED | OUTPATIENT
Start: 2024-05-10 | End: 2024-05-10 | Stop reason: HOSPADM

## 2024-05-10 RX ORDER — PROPOFOL 10 MG/ML
INJECTION, EMULSION INTRAVENOUS AS NEEDED
Status: DISCONTINUED | OUTPATIENT
Start: 2024-05-10 | End: 2024-05-10

## 2024-05-10 RX ORDER — FENTANYL CITRATE 50 UG/ML
INJECTION, SOLUTION INTRAMUSCULAR; INTRAVENOUS AS NEEDED
Status: DISCONTINUED | OUTPATIENT
Start: 2024-05-10 | End: 2024-05-10

## 2024-05-10 RX ORDER — ONDANSETRON 2 MG/ML
INJECTION INTRAMUSCULAR; INTRAVENOUS AS NEEDED
Status: DISCONTINUED | OUTPATIENT
Start: 2024-05-10 | End: 2024-05-10

## 2024-05-10 RX ORDER — FENTANYL CITRATE/PF 50 MCG/ML
25 SYRINGE (ML) INJECTION
Status: DISCONTINUED | OUTPATIENT
Start: 2024-05-10 | End: 2024-05-10 | Stop reason: HOSPADM

## 2024-05-10 RX ORDER — MIDAZOLAM HYDROCHLORIDE 2 MG/2ML
INJECTION, SOLUTION INTRAMUSCULAR; INTRAVENOUS AS NEEDED
Status: DISCONTINUED | OUTPATIENT
Start: 2024-05-10 | End: 2024-05-10

## 2024-05-10 RX ORDER — ACETAMINOPHEN 325 MG/1
975 TABLET ORAL EVERY 6 HOURS PRN
Status: DISCONTINUED | OUTPATIENT
Start: 2024-05-10 | End: 2024-05-10 | Stop reason: HOSPADM

## 2024-05-10 RX ORDER — SODIUM CHLORIDE, SODIUM LACTATE, POTASSIUM CHLORIDE, CALCIUM CHLORIDE 600; 310; 30; 20 MG/100ML; MG/100ML; MG/100ML; MG/100ML
125 INJECTION, SOLUTION INTRAVENOUS CONTINUOUS
Status: DISCONTINUED | OUTPATIENT
Start: 2024-05-10 | End: 2024-05-10

## 2024-05-10 RX ORDER — BUPIVACAINE HYDROCHLORIDE 5 MG/ML
INJECTION, SOLUTION EPIDURAL; INTRACAUDAL AS NEEDED
Status: DISCONTINUED | OUTPATIENT
Start: 2024-05-10 | End: 2024-05-10 | Stop reason: HOSPADM

## 2024-05-10 RX ADMIN — DEXAMETHASONE SODIUM PHOSPHATE 10 MG: 10 INJECTION INTRAMUSCULAR; INTRAVENOUS at 10:55

## 2024-05-10 RX ADMIN — FENTANYL CITRATE 25 MCG: 50 INJECTION INTRAMUSCULAR; INTRAVENOUS at 12:19

## 2024-05-10 RX ADMIN — SODIUM CHLORIDE, SODIUM LACTATE, POTASSIUM CHLORIDE, AND CALCIUM CHLORIDE 125 ML/HR: .6; .31; .03; .02 INJECTION, SOLUTION INTRAVENOUS at 08:45

## 2024-05-10 RX ADMIN — ONDANSETRON 4 MG: 2 INJECTION INTRAMUSCULAR; INTRAVENOUS at 10:55

## 2024-05-10 RX ADMIN — PROPOFOL 120 MCG/KG/MIN: 10 INJECTION, EMULSION INTRAVENOUS at 10:55

## 2024-05-10 RX ADMIN — ACETAMINOPHEN 975 MG: 325 TABLET, FILM COATED ORAL at 13:25

## 2024-05-10 RX ADMIN — FENTANYL CITRATE 100 MCG: 50 INJECTION INTRAMUSCULAR; INTRAVENOUS at 10:50

## 2024-05-10 RX ADMIN — MIDAZOLAM 2 MG: 1 INJECTION INTRAMUSCULAR; INTRAVENOUS at 10:50

## 2024-05-10 RX ADMIN — PROPOFOL 180 MG: 10 INJECTION, EMULSION INTRAVENOUS at 10:57

## 2024-05-10 RX ADMIN — FENTANYL CITRATE 25 MCG: 50 INJECTION INTRAMUSCULAR; INTRAVENOUS at 12:09

## 2024-05-10 NOTE — ANESTHESIA PREPROCEDURE EVALUATION
Procedure:  MARSUPILIZATION BARTHOLIN CYST RIGHT W/ EUA (Perineum)    Relevant Problems   NEURO/PSYCH   (+) Anxiety   (+) Depression      Behavioral Health   (+) Marijuana smoker      Urinary   (+) Bartholin's cyst      Care Coordination   (+) Housing instability        Physical Exam    Airway    Mallampati score: II  TM Distance: >3 FB  Neck ROM: full     Dental   No notable dental hx     Cardiovascular  Rhythm: regular, Rate: normal, Cardiovascular exam normal    Pulmonary  Pulmonary exam normal Breath sounds clear to auscultation    Other Findings  post-pubertal.      Anesthesia Plan  ASA Score- 2     Anesthesia Type- general with ASA Monitors.         Additional Monitors:     Airway Plan: LMA.           Plan Factors-    Chart reviewed.   Existing labs reviewed. Patient summary reviewed.    Patient is a current smoker.  Patient instructed to abstain from smoking on day of procedure. Patient did not smoke on day of surgery.    There is medical exclusion for perioperative obstructive sleep apnea risk education.        Induction- intravenous.    Postoperative Plan-     Informed Consent- Anesthetic plan and risks discussed with patient.

## 2024-05-10 NOTE — DISCHARGE INSTRUCTIONS
A yellow solution was placed on your incision after the surgery to help stop bleeding.  Do not be alarmed if you see first yellow discharge followed by dark brown or black discharge that looks like coffee grounds.  This is the solution and is perfectly healthy and normal.    Please take Tylenol and Motrin every 6 hours as needed for pain.  You can alternate the 2. Please call or return if you have fever, chills, chest pain, shortness of breath, pain that is worsening instead of improving, heavy bleeding.

## 2024-05-10 NOTE — ANESTHESIA POSTPROCEDURE EVALUATION
Post-Op Assessment Note    CV Status:  Stable    Pain management: adequate       Mental Status:  Alert     Post Op Vitals Reviewed: Yes    No anethesia notable event occurred.    Staff: CRNA               BP   135/81   Temp   99.8   Pulse  100   Resp   12   SpO2   93

## 2024-05-10 NOTE — INTERVAL H&P NOTE
H&P reviewed. After examining the patient I find no changes in the patients condition since the H&P had been written.    Vitals:    05/10/24 0856   BP: 109/74   Pulse: 84   Resp: 16   Temp: 98 °F (36.7 °C)   SpO2: 96%

## 2024-05-10 NOTE — OP NOTE
OPERATIVE REPORT  PATIENT NAME: Carolyne Hdz    :  1990  MRN: [id number]  Pt Location: [facility] OR ROOM 04    SURGERY DATE: 5/10/2024    Surgeons and Role:     * Caren Clark MD - Primary     * Liz Bell MD - Assisting    Preop Diagnosis:  Cyst of Bartholin's gland [N75.0]  Moderate dysplasia of cervix [N87.1]    Post-Op Diagnosis Codes:     * Cyst of Bartholin's gland [N75.0]     * Moderate dysplasia of cervix [N87.1]    Procedure(s):  MARSUPILIZATION BARTHOLIN CYST RIGHT W/ EUA    Specimen(s):  * No specimens in log *    Estimated Blood Loss:   Minimal    Drains:  * No LDAs found *    Anesthesia Type:   Conscious Sedation     Operative Indications:  Cyst of Bartholin's gland [N75.0]  Moderate dysplasia of cervix [N87.1]    Operative Findings:  - intact right bartholin's cyst  - otherwise normal appearing external female genitalia   - normal bimanual examination. Mid position uterus normal in size and consistency      Procedure and Technique:  Patient was taken to the operating room where correct patient and correct procedure were confirmed. Conscious sedation was administered and the patient was positioned on the OR table in the dorsal lithotomy position. All pressure points were padded and a leon hugger was placed to maintain control of core body temperature.  The patient was prepped and draped in the usual sterile fashion with chlorhexidine prep on the vagina and perineum. A time out was performed.    A straight catheter was introduced into the bladder, which was drained of 125 cc of clear yellow urine.     Bupivacaine 0.5% was injected around the bartholin's cyst on the right. A scalpel was used to incise the vaginal mucosa until the bartholin cyst wall was identified.  A scalpel was then used to open the cyst and thick brown fluid was suctioned out.  Allis clamps were placed on both the mucosal edges and the cyst wall edges.  2-0 Vicryl was used to marsupialize the cyst wall to the mucosa.   Bovie cautery was used for hemostasis.  Monsel solution was placed.  Excellent hemostasis was noted.    At the conclusion of the procedure, all needle, sponge, and instrument counts were noted to be correct x2. Patient tolerated the procedure well and was transferred to PACU in stable condition.    Dr. Clark was present and participated in the entire case.    Complications:   None apparent     Patient Disposition:  PACU         SIGNATURE: Liz Bell MD  DATE: May 10, 2024  TIME: 11:56 AM

## 2024-05-10 NOTE — PROGRESS NOTES
Pt states pain is manageable and wishes to go home. 975 mg tylenol given. Pt has iced karly pad in place and was instructed to rest and take it easy for next 48 hours. Pt verbalized understanding and is agreeable

## 2024-05-14 ENCOUNTER — CLINICAL SUPPORT (OUTPATIENT)
Dept: OBGYN CLINIC | Facility: CLINIC | Age: 34
End: 2024-05-14

## 2024-05-14 VITALS
DIASTOLIC BLOOD PRESSURE: 87 MMHG | WEIGHT: 184 LBS | BODY MASS INDEX: 28.82 KG/M2 | HEART RATE: 112 BPM | SYSTOLIC BLOOD PRESSURE: 122 MMHG

## 2024-05-14 DIAGNOSIS — Z23 NEED FOR HPV VACCINATION: Primary | ICD-10-CM

## 2024-05-14 DIAGNOSIS — Z59.41 FOOD INSECURITY: ICD-10-CM

## 2024-05-14 DIAGNOSIS — Z59.819 HOUSING INSTABILITY: ICD-10-CM

## 2024-05-14 DIAGNOSIS — Z59.12 INADEQUATE HOUSING UTILITIES: ICD-10-CM

## 2024-05-14 DIAGNOSIS — Z59.9 FINANCIAL DIFFICULTIES: ICD-10-CM

## 2024-05-14 PROCEDURE — 90471 IMMUNIZATION ADMIN: CPT

## 2024-05-14 PROCEDURE — 90651 9VHPV VACCINE 2/3 DOSE IM: CPT

## 2024-05-14 SDOH — ECONOMIC STABILITY - FOOD INSECURITY: FOOD INSECURITY: Z59.41

## 2024-05-14 SDOH — ECONOMIC STABILITY - INCOME SECURITY: PROBLEM RELATED TO HOUSING AND ECONOMIC CIRCUMSTANCES, UNSPECIFIED: Z59.9

## 2024-05-14 SDOH — ECONOMIC STABILITY - HOUSING INSECURITY: INADEQUATE HOUSING UTILITIES: Z59.12

## 2024-05-14 SDOH — ECONOMIC STABILITY - HOUSING INSECURITY: HOUSING INSTABILITY UNSPECIFIED: Z59.819

## 2024-05-14 NOTE — PROGRESS NOTES
Hpv given to patient in left buttock on 5/14/2024.    NDC: 9006-9842-28  LOT: [id number]  EXP: 9/23/2026

## 2024-05-23 ENCOUNTER — OFFICE VISIT (OUTPATIENT)
Dept: OBGYN CLINIC | Facility: CLINIC | Age: 34
End: 2024-05-23

## 2024-05-23 VITALS
DIASTOLIC BLOOD PRESSURE: 79 MMHG | HEART RATE: 106 BPM | SYSTOLIC BLOOD PRESSURE: 111 MMHG | BODY MASS INDEX: 28.82 KG/M2 | WEIGHT: 183.6 LBS | HEIGHT: 67 IN

## 2024-05-23 DIAGNOSIS — Z09 POSTOPERATIVE EXAMINATION: Primary | ICD-10-CM

## 2024-05-23 PROCEDURE — 99024 POSTOP FOLLOW-UP VISIT: CPT | Performed by: OBSTETRICS & GYNECOLOGY

## 2024-05-23 NOTE — PROGRESS NOTES
"[facility] Obstetrics & Gynecology    Problem List Items Addressed This Visit          Orthopedic/Musculoskeletal    Postoperative examination - Primary     Resume sitz baths with epsom salt  Discussed that if Marsupialization does not create a long-term opening for continued drainage, gland excision may be required in the future  Follow up for annual with cervical cancer screening in 2025 or sooner if needed            Subjective:   Carolyne Hdz is a 34 y.o.  here for post-op check.  S/p marsupialization of right sided bartholin's cyst on 5/10.  Reports continued drainage from the area with some mild discomfort.  No fevers or chills.  Did sitz baths for a couple day then stopped.    Objective:  Vitals: Blood pressure 111/79, pulse (!) 106, height 5' 7\" (1.702 m), weight 83.3 kg (183 lb 9.6 oz), last menstrual period 05/10/2024. Body mass index is 28.76 kg/m².    Physical Exam  Vitals reviewed.   Constitutional:       General: She is not in acute distress.     Appearance: She is not ill-appearing or toxic-appearing.   Pulmonary:      Effort: Pulmonary effort is normal. No respiratory distress.   Genitourinary:     Comments: Small amount of fluctuance in the area of the right Bartholin's gland with fluid expressed. Area mildly tender. No erythema  Musculoskeletal:         General: Normal range of motion.   Skin:     General: Skin is warm and dry.   Neurological:      Mental Status: She is alert and oriented to person, place, and time.   Psychiatric:         Behavior: Behavior normal.         Patient discussed with Dr. Solomon.    Sherrie Suárez MD  PGY-IV, OB/GYN  2024    "

## 2024-05-23 NOTE — ASSESSMENT & PLAN NOTE
Resume sitz baths with epsom salt  Discussed that if Marsupialization does not create a long-term opening for continued drainage, gland excision may be required in the future  Follow up for annual with cervical cancer screening in March 2025 or sooner if needed

## 2024-05-24 ENCOUNTER — OFFICE VISIT (OUTPATIENT)
Dept: FAMILY MEDICINE CLINIC | Facility: CLINIC | Age: 34
End: 2024-05-24

## 2024-05-24 VITALS
TEMPERATURE: 93.3 F | RESPIRATION RATE: 18 BRPM | HEART RATE: 116 BPM | DIASTOLIC BLOOD PRESSURE: 76 MMHG | SYSTOLIC BLOOD PRESSURE: 100 MMHG | WEIGHT: 185 LBS | OXYGEN SATURATION: 100 % | BODY MASS INDEX: 28.98 KG/M2

## 2024-05-24 DIAGNOSIS — F41.9 ANXIETY: Primary | ICD-10-CM

## 2024-05-24 PROCEDURE — 99213 OFFICE O/P EST LOW 20 MIN: CPT | Performed by: FAMILY MEDICINE

## 2024-05-24 RX ORDER — CITALOPRAM 20 MG/1
20 TABLET ORAL DAILY
Qty: 30 TABLET | Refills: 0 | Status: SHIPPED | OUTPATIENT
Start: 2024-05-24

## 2024-05-24 NOTE — ASSESSMENT & PLAN NOTE
Reports having feelings of increased anxiety for which she occasionally self medicates with marijuana smoking.  She continues to use marijuana.  She compliant with on her Celexa started at last visit, and reports improvement in symptoms, but wants to see if a higher dose will improve her anxiety more.  No SI/HI.         Plan:  -Increase Celexa to 20mg  -Follow-up in 3-4 weeks  -Counseled on side effects and time to affect for SSRIs  -Counseled on cessation of marijuana smoking

## 2024-05-24 NOTE — PROGRESS NOTES
"Ambulatory Visit  Name: Carolyne Hdz      : 1990      MRN: [id number]  Encounter Provider: Johan Avilez DO  Encounter Date: 2024   Encounter department: [facility]    Assessment & Plan   1. Anxiety  Assessment & Plan:  Reports having feelings of increased anxiety for which she occasionally self medicates with marijuana smoking.  She continues to use marijuana.  She compliant with on her Celexa started at last visit, and reports improvement in symptoms, but wants to see if a higher dose will improve her anxiety more.  No SI/HI.         Plan:  -Increase Celexa to 20mg  -Follow-up in 3-4 weeks  -Counseled on side effects and time to affect for SSRIs  -Counseled on cessation of marijuana smoking  Orders:  -     citalopram (CeleXA) 20 mg tablet; Take 1 tablet (20 mg total) by mouth daily         History of Present Illness     Patient is a 33-year-old female who presents to the clinic today with concerns for follow up for anxiety.    Patient reports that her anxiety improved since consistently taking her Celexa 10mg.  She continues to report smoking marijuana to help \"self-medicate\" however.  She is interested in continuing on Celexa, but would like to increase her dose as she still has significant anxiety.  Most recently, she had marsupialization of bartholin gland cyst, but is anxious that the cyst will return.  She follows with GYN.      Review of Systems   Constitutional:  Negative for chills, fatigue and fever.   Eyes:  Negative for visual disturbance.   Respiratory:  Negative for shortness of breath.    Cardiovascular:  Negative for chest pain and palpitations.   Gastrointestinal:  Negative for abdominal pain, nausea and vomiting.   Skin:  Negative for color change and rash.   Neurological:  Negative for dizziness, seizures, syncope, weakness, light-headedness and headaches.   Psychiatric/Behavioral:  Negative for self-injury, sleep disturbance and suicidal " ideas. The patient is nervous/anxious.    All other systems reviewed and are negative.    Past Medical History:   Diagnosis Date    Abnormal Pap smear of cervix     3/2024 ASCUS pap/+ other HRHPV    Anxiety     Bartholin's gland cyst     req'd I&D multiple times 2023/2024    Depression 5/10/2024    Goiter     Migraine      Past Surgical History:   Procedure Laterality Date    NO PAST SURGERIES      LA MARSUPIALIZATION BARTHOLINS GLAND CYST N/A 5/10/2024    Procedure: MARSUPILIZATION BARTHOLIN CYST RIGHT W/ EUA;  Surgeon: Caren Clark MD;  Location: [facility] OR;  Service: Gynecology     Family History   Problem Relation Age of Onset    Ovarian cancer Mother     Cervical cancer Mother     No Known Problems Father     Breast cancer Neg Hx     Colon cancer Neg Hx      Social History     Tobacco Use    Smoking status: Every Day     Types: E-Cigarettes     Passive exposure: Current    Smokeless tobacco: Never   Vaping Use    Vaping status: Some Days    Substances: Nicotine, Flavoring   Substance and Sexual Activity    Alcohol use: Not Currently     Comment: couple times/year    Drug use: Yes     Types: Marijuana     Comment: last used 5/9/24    Sexual activity: Not Currently     Partners: Male     Birth control/protection: Condom Male, Injection     Current Outpatient Medications on File Prior to Visit   Medication Sig    medroxyPROGESTERone (DEPO-PROVERA) 150 mg/mL injection Inject 1 mL (150 mg total) into a muscle every 3 (three) months (Patient not taking: Reported on 5/14/2024)    [DISCONTINUED] citalopram (CeleXA) 10 mg tablet Take 1 tablet (10 mg total) by mouth daily (Patient not taking: Reported on 5/23/2024)     No Known Allergies  Immunization History   Administered Date(s) Administered    HPV9 03/04/2024, 05/14/2024    Tdap 08/01/2023     Objective     /76 (BP Location: Left arm, Patient Position: Sitting, Cuff Size: Large)   Pulse (!) 116   Temp (!) 93.3 °F (34.1 °C) (Temporal)   Resp 18   Wt  83.9 kg (185 lb)   LMP 05/10/2024 (Approximate)   SpO2 100%   BMI 28.98 kg/m²     Physical Exam  Constitutional:       Appearance: Normal appearance.   Eyes:      Extraocular Movements: Extraocular movements intact.      Pupils: Pupils are equal, round, and reactive to light.   Cardiovascular:      Rate and Rhythm: Normal rate and regular rhythm.      Heart sounds: Normal heart sounds.   Pulmonary:      Effort: Pulmonary effort is normal.      Breath sounds: Normal breath sounds.   Abdominal:      General: Abdomen is flat.   Neurological:      Mental Status: She is alert and oriented to person, place, and time.   Psychiatric:         Mood and Affect: Mood is anxious.         Thought Content: Thought content does not include homicidal or suicidal ideation. Thought content does not include homicidal or suicidal plan.       Administrative Statements

## 2024-06-07 DIAGNOSIS — F41.9 ANXIETY: ICD-10-CM

## 2024-06-10 RX ORDER — CITALOPRAM 20 MG/1
20 TABLET ORAL DAILY
Qty: 90 TABLET | Refills: 1 | Status: SHIPPED | OUTPATIENT
Start: 2024-06-10

## 2024-09-10 ENCOUNTER — OFFICE VISIT (OUTPATIENT)
Dept: DENTISTRY | Facility: CLINIC | Age: 34
End: 2024-09-10

## 2024-09-10 VITALS — DIASTOLIC BLOOD PRESSURE: 80 MMHG | HEART RATE: 73 BPM | TEMPERATURE: 97.9 F | SYSTOLIC BLOOD PRESSURE: 116 MMHG

## 2024-09-10 DIAGNOSIS — K08.89 PAIN, DENTAL: Primary | ICD-10-CM

## 2024-09-10 DIAGNOSIS — K04.7 DENTAL ABSCESS: ICD-10-CM

## 2024-09-10 PROCEDURE — D0220 INTRAORAL - PERIAPICAL FIRST RADIOGRAPHIC IMAGE: HCPCS | Performed by: DENTAL HYGIENIST

## 2024-09-10 PROCEDURE — D0140 LIMITED ORAL EVALUATION - PROBLEM FOCUSED: HCPCS | Performed by: DENTIST

## 2024-09-10 RX ORDER — AMOXICILLIN 500 MG/1
500 CAPSULE ORAL EVERY 8 HOURS SCHEDULED
Qty: 21 CAPSULE | Refills: 0 | Status: SHIPPED | OUTPATIENT
Start: 2024-09-10 | End: 2024-09-17

## 2024-09-10 NOTE — DENTAL PROCEDURE DETAILS
Subjective   Patient ID: Carolyne Hdz is a 34 y.o. female.  Chief Complaint   Patient presents with    New Patient Visit    Emergency/limited Exam     Reviewed medical history   ASA I  Pain - 5, but sometimes a 10    CC:  Broken tooth and pain on UR  ---Many years since she has been to a dentist    Today:  Limited Exam,  1 PA #5    DX:    ---Tooth #5 broken down to the root.  Percussion sensitive and PARL.  Non-restorable - needs ext.  ---Tooth #4 has large MO caries and PARL starting.  Gave pt the option of a RCT or EXT.  Pt chose to have the tooth extracted.  ---Referred to OS for 4 and 5.    Exam:  Dr. Smith  Referral:  OS  Please give OS referral and xray from printer     After ext's, then NV1:  Comp, FMX, FMP

## 2024-09-12 ENCOUNTER — TELEPHONE (OUTPATIENT)
Dept: OBGYN CLINIC | Facility: CLINIC | Age: 34
End: 2024-09-12

## 2025-07-11 ENCOUNTER — TELEPHONE (OUTPATIENT)
Dept: OBGYN CLINIC | Facility: CLINIC | Age: 35
End: 2025-07-11

## (undated) DEVICE — INTENDED FOR TISSUE SEPARATION, AND OTHER PROCEDURES THAT REQUIRE A SHARP SURGICAL BLADE TO PUNCTURE OR CUT.: Brand: BARD-PARKER SAFETY BLADES SIZE 15, STERILE

## (undated) DEVICE — CULTURE TUBE ANAEROBIC

## (undated) DEVICE — SUT VICRYL 3-0 SH 27 IN J416H

## (undated) DEVICE — POOLE SUCTION HANDLE: Brand: CARDINAL HEALTH

## (undated) DEVICE — GLOVE PI ULTRA TOUCH SZ.6.5

## (undated) DEVICE — NEEDLE 25G X 1 1/2

## (undated) DEVICE — NEEDLE COUNTER LG W/RULER

## (undated) DEVICE — HYDROGEN PEROXIDE 3 PCT 4OZ

## (undated) DEVICE — SCD SEQUENTIAL COMPRESSION COMFORT SLEEVE MEDIUM KNEE LENGTH: Brand: KENDALL SCD

## (undated) DEVICE — PVC URETHRAL CATHETER: Brand: DOVER

## (undated) DEVICE — BETHLEHEM UNIVERSAL MINOR VAG: Brand: CARDINAL HEALTH

## (undated) DEVICE — SYRINGE 10ML LL

## (undated) DEVICE — 2000CC GUARDIAN II: Brand: GUARDIAN

## (undated) DEVICE — CULTURE TUBE AEROBIC

## (undated) DEVICE — TUBING SUCTION 5MM X 12 FT

## (undated) DEVICE — DRAPE EQUIPMENT RF WAND

## (undated) DEVICE — PREMIUM DRY TRAY LF: Brand: MEDLINE INDUSTRIES, INC.

## (undated) DEVICE — PENCIL ELECTROSURG E-Z CLEAN -0035H